# Patient Record
Sex: MALE | Race: WHITE | NOT HISPANIC OR LATINO
[De-identification: names, ages, dates, MRNs, and addresses within clinical notes are randomized per-mention and may not be internally consistent; named-entity substitution may affect disease eponyms.]

---

## 2018-11-23 PROBLEM — Z00.00 ENCOUNTER FOR PREVENTIVE HEALTH EXAMINATION: Status: ACTIVE | Noted: 2018-11-23

## 2018-11-26 ENCOUNTER — APPOINTMENT (OUTPATIENT)
Dept: GASTROENTEROLOGY | Facility: HOSPITAL | Age: 68
End: 2018-11-26

## 2019-01-08 ENCOUNTER — RECORD ABSTRACTING (OUTPATIENT)
Age: 69
End: 2019-01-08

## 2019-01-08 DIAGNOSIS — I49.3 VENTRICULAR PREMATURE DEPOLARIZATION: ICD-10-CM

## 2019-01-08 DIAGNOSIS — Z82.3 FAMILY HISTORY OF STROKE: ICD-10-CM

## 2019-01-08 DIAGNOSIS — Z80.1 FAMILY HISTORY OF MALIGNANT NEOPLASM OF TRACHEA, BRONCHUS AND LUNG: ICD-10-CM

## 2019-01-08 DIAGNOSIS — Z86.39 PERSONAL HISTORY OF OTHER ENDOCRINE, NUTRITIONAL AND METABOLIC DISEASE: ICD-10-CM

## 2019-01-08 DIAGNOSIS — Z86.69 PERSONAL HISTORY OF OTHER DISEASES OF THE NERVOUS SYSTEM AND SENSE ORGANS: ICD-10-CM

## 2019-01-08 DIAGNOSIS — Z82.5 FAMILY HISTORY OF ASTHMA AND OTHER CHRONIC LOWER RESPIRATORY DISEASES: ICD-10-CM

## 2019-01-08 RX ORDER — BIOTIN 10 MG
TABLET ORAL
Refills: 0 | Status: ACTIVE | COMMUNITY

## 2019-01-08 RX ORDER — CALCIUM CARBONATE/VITAMIN D3 600 MG-10
1200 TABLET ORAL
Refills: 0 | Status: ACTIVE | COMMUNITY

## 2019-01-09 ENCOUNTER — APPOINTMENT (OUTPATIENT)
Dept: INTERNAL MEDICINE | Facility: CLINIC | Age: 69
End: 2019-01-09
Payer: MEDICARE

## 2019-01-09 ENCOUNTER — TRANSCRIPTION ENCOUNTER (OUTPATIENT)
Age: 69
End: 2019-01-09

## 2019-01-09 VITALS
HEIGHT: 66 IN | SYSTOLIC BLOOD PRESSURE: 120 MMHG | WEIGHT: 168 LBS | HEART RATE: 68 BPM | BODY MASS INDEX: 27 KG/M2 | DIASTOLIC BLOOD PRESSURE: 80 MMHG

## 2019-01-09 DIAGNOSIS — Z87.891 PERSONAL HISTORY OF NICOTINE DEPENDENCE: ICD-10-CM

## 2019-01-09 PROCEDURE — 99213 OFFICE O/P EST LOW 20 MIN: CPT | Mod: 25

## 2019-01-09 PROCEDURE — 36415 COLL VENOUS BLD VENIPUNCTURE: CPT

## 2019-01-09 NOTE — HISTORY OF PRESENT ILLNESS
[FreeTextEntry1] : here for followup of hypertension [de-identified] :  Compared to last visit the hypertension is at goal . Lifestyle modifications that the patient has adopted include dietary sodium reduction , increasing physical activity , attempts at weight reduction . Responses to the medications has been good . Adverse effects of the medication include none . Patient denies chest pain , palpitations, headaches, but + occasional orthostatic dizziness. \par      Signs of abuse or neglect? No. Fall Risk/History of Falling No. \par      no 3 p's, noctiuria. Last A1c 5.2;

## 2019-01-09 NOTE — REVIEW OF SYSTEMS
[Fever] : no fever [Chills] : no chills [Chest Pain] : no chest pain [Palpitations] : no palpitations [Orthopena] : no orthopnea [Headache] : no headache [Dizziness] : no dizziness

## 2019-01-09 NOTE — PHYSICAL EXAM
[No Acute Distress] : no acute distress [Well Nourished] : well nourished [Normal Sclera/Conjunctiva] : normal sclera/conjunctiva [No JVD] : no jugular venous distention [No Respiratory Distress] : no respiratory distress  [Clear to Auscultation] : lungs were clear to auscultation bilaterally [Normal Rate] : normal rate  [Regular Rhythm] : with a regular rhythm [Normal S1, S2] : normal S1 and S2 [No Murmur] : no murmur heard [No Edema] : there was no peripheral edema [de-identified] : no xanthelasm [de-identified] : +sinus arrhythmia

## 2019-01-09 NOTE — ASSESSMENT
[FreeTextEntry1] : Essential (primary) hypertension - I10 (Primary), Cont med and low salt diet. Urged to increase physical activity and cont efforts at wt loss. Bp control is much better with less dizziness after stopping amlodipine.\par \par Pure hyperglyceridemia - E78.1, Tg's high occasionally but no sx's sec to it and LDL remains ok. Follow. LDL goal is <130 at a minimum. Last LDL at goal 100, \par \par we will recheck A1c if glucose persistently elevated\par

## 2019-01-10 LAB
ALBUMIN SERPL ELPH-MCNC: 4.4 G/DL
ALP BLD-CCNC: 57 U/L
ALT SERPL-CCNC: 22 U/L
ANION GAP SERPL CALC-SCNC: 11 MMOL/L
AST SERPL-CCNC: 15 U/L
BILIRUB SERPL-MCNC: 0.4 MG/DL
BUN SERPL-MCNC: 19 MG/DL
CALCIUM SERPL-MCNC: 9.7 MG/DL
CHLORIDE SERPL-SCNC: 109 MMOL/L
CHOLEST SERPL-MCNC: 220 MG/DL
CHOLEST/HDLC SERPL: 4.9 RATIO
CK SERPL-CCNC: 87 U/L
CO2 SERPL-SCNC: 26 MMOL/L
CREAT SERPL-MCNC: 0.92 MG/DL
GLUCOSE SERPL-MCNC: 105 MG/DL
HDLC SERPL-MCNC: 45 MG/DL
LDLC SERPL CALC-MCNC: 133 MG/DL
POTASSIUM SERPL-SCNC: 5.2 MMOL/L
PROT SERPL-MCNC: 6.8 G/DL
SODIUM SERPL-SCNC: 146 MMOL/L
TRIGL SERPL-MCNC: 209 MG/DL

## 2019-01-15 ENCOUNTER — RESULT REVIEW (OUTPATIENT)
Age: 69
End: 2019-01-15

## 2019-03-04 ENCOUNTER — TRANSCRIPTION ENCOUNTER (OUTPATIENT)
Age: 69
End: 2019-03-04

## 2019-05-04 ENCOUNTER — APPOINTMENT (OUTPATIENT)
Dept: INTERNAL MEDICINE | Facility: CLINIC | Age: 69
End: 2019-05-04
Payer: MEDICARE

## 2019-05-04 VITALS
SYSTOLIC BLOOD PRESSURE: 130 MMHG | WEIGHT: 164 LBS | HEIGHT: 66 IN | DIASTOLIC BLOOD PRESSURE: 82 MMHG | HEART RATE: 68 BPM | BODY MASS INDEX: 26.36 KG/M2

## 2019-05-04 PROCEDURE — 99213 OFFICE O/P EST LOW 20 MIN: CPT | Mod: 25

## 2019-05-04 PROCEDURE — 36415 COLL VENOUS BLD VENIPUNCTURE: CPT

## 2019-05-04 NOTE — REVIEW OF SYSTEMS
[Chest Pain] : no chest pain [Palpitations] : no palpitations [Orthopena] : no orthopnea [Headache] : no headache [Dizziness] : no dizziness

## 2019-05-04 NOTE — ASSESSMENT
[FreeTextEntry1] : Essential (primary) hypertension - I10 (Primary), Cont med and low salt diet. Urged to increase physical activity and cont efforts at wt loss. Bp control is much better \par \par Pure hyperglyceridemia - E78.1, Tg's high occasionally but no sx's sec to it and LDL remains ok. Follow. LDL goal is <130 at a minimum. Last LDL NOT at goal 133 \par \par we will recheck A1c if glucose persistently elevated\par

## 2019-05-04 NOTE — HISTORY OF PRESENT ILLNESS
[FreeTextEntry1] : here for followup of hypertension [de-identified] :  Compared to last visit the hypertension is at goal . Lifestyle modifications that the patient has adopted include dietary sodium reduction , increasing physical activity , attempts at weight reduction . Responses to the medications has been good . Adverse effects of the medication include none . Patient denies chest pain , palpitations, headaches, but + occasional orthostatic dizziness. \par      Signs of abuse or neglect? No. Fall Risk/History of Falling No. \par      no 3 p's, noctiuria. Last A1c 5.2;

## 2019-05-06 LAB
ALBUMIN SERPL ELPH-MCNC: 4.6 G/DL
ALP BLD-CCNC: 51 U/L
ALT SERPL-CCNC: 23 U/L
ANION GAP SERPL CALC-SCNC: 11 MMOL/L
AST SERPL-CCNC: 17 U/L
BILIRUB SERPL-MCNC: 0.5 MG/DL
BUN SERPL-MCNC: 18 MG/DL
CALCIUM SERPL-MCNC: 9.7 MG/DL
CHLORIDE SERPL-SCNC: 107 MMOL/L
CHOLEST SERPL-MCNC: 232 MG/DL
CHOLEST/HDLC SERPL: 5.5 RATIO
CO2 SERPL-SCNC: 24 MMOL/L
CREAT SERPL-MCNC: 0.98 MG/DL
ESTIMATED AVERAGE GLUCOSE: 105 MG/DL
GLUCOSE SERPL-MCNC: 100 MG/DL
HBA1C MFR BLD HPLC: 5.3 %
HDLC SERPL-MCNC: 42 MG/DL
LDLC SERPL CALC-MCNC: 119 MG/DL
POTASSIUM SERPL-SCNC: 4.7 MMOL/L
PROT SERPL-MCNC: 6.9 G/DL
SODIUM SERPL-SCNC: 142 MMOL/L
TRIGL SERPL-MCNC: 356 MG/DL

## 2019-05-07 ENCOUNTER — RESULT REVIEW (OUTPATIENT)
Age: 69
End: 2019-05-07

## 2019-08-03 ENCOUNTER — APPOINTMENT (OUTPATIENT)
Dept: INTERNAL MEDICINE | Facility: CLINIC | Age: 69
End: 2019-08-03
Payer: MEDICARE

## 2019-08-03 VITALS
SYSTOLIC BLOOD PRESSURE: 130 MMHG | HEIGHT: 66 IN | DIASTOLIC BLOOD PRESSURE: 80 MMHG | WEIGHT: 168 LBS | BODY MASS INDEX: 27 KG/M2 | HEART RATE: 64 BPM

## 2019-08-03 PROCEDURE — 99213 OFFICE O/P EST LOW 20 MIN: CPT | Mod: 25

## 2019-08-03 PROCEDURE — 36415 COLL VENOUS BLD VENIPUNCTURE: CPT

## 2019-08-03 NOTE — ASSESSMENT
[FreeTextEntry1] : Essential (primary) hypertension - I10 (Primary), Cont med and low salt diet. Urged to increase physical activity and cont efforts at wt loss. Bp control is GOOD\par \par Pure hyperglyceridemia - E78.1, Tg's high occasionally but no sx's sec to it and LDL remains ok. Follow. LDL goal is <130 at a minimum. Last LDL at goal 119 \par \par

## 2019-08-03 NOTE — HISTORY OF PRESENT ILLNESS
[FreeTextEntry1] : here for followup of hypertension [de-identified] :  Compared to last visit the hypertension is at goal . Lifestyle modifications that the patient has adopted include dietary sodium reduction , increasing physical activity , attempts at weight reduction . Responses to the medications has been good . Adverse effects of the medication include none . Patient denies chest pain , palpitations, headaches, but + occasional orthostatic dizziness. \par      Signs of abuse or neglect? No. Fall Risk/History of Falling No. \par      no 3 p's, noctiuria. Last A1c 5.3;   May

## 2019-08-03 NOTE — REVIEW OF SYSTEMS
[Chest Pain] : no chest pain [Palpitations] : no palpitations [Orthopena] : no orthopnea [Dizziness] : no dizziness [Headache] : no headache

## 2019-08-03 NOTE — PHYSICAL EXAM
[No Acute Distress] : no acute distress [Well Nourished] : well nourished [No JVD] : no jugular venous distention [No Respiratory Distress] : no respiratory distress  [Clear to Auscultation] : lungs were clear to auscultation bilaterally [Normal] : normal rate, regular rhythm, normal S1 and S2 and no murmur heard [No Carotid Bruits] : no carotid bruits [No Edema] : there was no peripheral edema [de-identified] : no xanthelasmas [de-identified] : +sinus arrhythmia

## 2019-08-05 LAB
ALBUMIN SERPL ELPH-MCNC: 4.5 G/DL
ALP BLD-CCNC: 52 U/L
ALT SERPL-CCNC: 22 U/L
ANION GAP SERPL CALC-SCNC: 10 MMOL/L
AST SERPL-CCNC: 16 U/L
BILIRUB SERPL-MCNC: 0.6 MG/DL
BUN SERPL-MCNC: 20 MG/DL
CALCIUM SERPL-MCNC: 9.5 MG/DL
CHLORIDE SERPL-SCNC: 108 MMOL/L
CHOLEST SERPL-MCNC: 240 MG/DL
CHOLEST/HDLC SERPL: 6.2 RATIO
CO2 SERPL-SCNC: 24 MMOL/L
CREAT SERPL-MCNC: 1.01 MG/DL
GLUCOSE SERPL-MCNC: 96 MG/DL
HDLC SERPL-MCNC: 39 MG/DL
LDLC SERPL CALC-MCNC: 130 MG/DL
POTASSIUM SERPL-SCNC: 4.6 MMOL/L
PROT SERPL-MCNC: 6.8 G/DL
SODIUM SERPL-SCNC: 142 MMOL/L
TRIGL SERPL-MCNC: 353 MG/DL

## 2019-08-06 ENCOUNTER — RESULT REVIEW (OUTPATIENT)
Age: 69
End: 2019-08-06

## 2019-11-04 ENCOUNTER — APPOINTMENT (OUTPATIENT)
Dept: INTERNAL MEDICINE | Facility: CLINIC | Age: 69
End: 2019-11-04
Payer: MEDICARE

## 2019-11-04 VITALS
HEART RATE: 68 BPM | BODY MASS INDEX: 26.95 KG/M2 | DIASTOLIC BLOOD PRESSURE: 70 MMHG | SYSTOLIC BLOOD PRESSURE: 150 MMHG | WEIGHT: 167 LBS

## 2019-11-04 PROCEDURE — 36415 COLL VENOUS BLD VENIPUNCTURE: CPT

## 2019-11-04 PROCEDURE — G0444 DEPRESSION SCREEN ANNUAL: CPT | Mod: 59

## 2019-11-04 PROCEDURE — 99213 OFFICE O/P EST LOW 20 MIN: CPT | Mod: 25

## 2019-11-04 PROCEDURE — G0008: CPT

## 2019-11-04 PROCEDURE — 90653 IIV ADJUVANT VACCINE IM: CPT

## 2019-11-04 NOTE — ASSESSMENT
[FreeTextEntry1] : Essential (primary) hypertension - I10 (Primary), Cont med and low salt diet. Urged to increase physical activity and cont efforts at wt loss. Bp control is GOOD\par \par Pure hyperglyceridemia - E78.1, Tg's high occasionally but no sx's sec to it and LDL remains ok. Follow. LDL goal is <130 at a minimum. Last LDL at goal 130 \par \par right knee pain- no residual abnormality of the right knee is noted. No inflammatory changes. Trial of Tylenol and/or Advil as directed discussed with patient.\par \par Risks/benefits of flu vaccine discussed with patient and patient understands and accepts.\par \par

## 2019-11-04 NOTE — PHYSICAL EXAM
[No Acute Distress] : no acute distress [Well Nourished] : well nourished [No JVD] : no jugular venous distention [No Respiratory Distress] : no respiratory distress  [Clear to Auscultation] : lungs were clear to auscultation bilaterally [Normal] : normal rate, regular rhythm, normal S1 and S2 and no murmur heard [No Carotid Bruits] : no carotid bruits [No Edema] : there was no peripheral edema [de-identified] : no xanthelasmas [de-identified] : +sinus arrhythmia; no S4

## 2019-11-05 ENCOUNTER — RESULT REVIEW (OUTPATIENT)
Age: 69
End: 2019-11-05

## 2019-11-05 LAB
ALBUMIN SERPL ELPH-MCNC: 4.4 G/DL
ALP BLD-CCNC: 52 U/L
ALT SERPL-CCNC: 24 U/L
ANION GAP SERPL CALC-SCNC: 13 MMOL/L
AST SERPL-CCNC: 15 U/L
BILIRUB SERPL-MCNC: 0.6 MG/DL
BUN SERPL-MCNC: 15 MG/DL
CALCIUM SERPL-MCNC: 9.4 MG/DL
CHLORIDE SERPL-SCNC: 107 MMOL/L
CHOLEST SERPL-MCNC: 241 MG/DL
CHOLEST/HDLC SERPL: 6 RATIO
CO2 SERPL-SCNC: 24 MMOL/L
CREAT SERPL-MCNC: 0.96 MG/DL
GLUCOSE SERPL-MCNC: 104 MG/DL
HDLC SERPL-MCNC: 40 MG/DL
LDLC SERPL CALC-MCNC: 126 MG/DL
POTASSIUM SERPL-SCNC: 4.4 MMOL/L
PROT SERPL-MCNC: 7 G/DL
SODIUM SERPL-SCNC: 144 MMOL/L
TRIGL SERPL-MCNC: 374 MG/DL

## 2020-02-10 ENCOUNTER — APPOINTMENT (OUTPATIENT)
Dept: INTERNAL MEDICINE | Facility: CLINIC | Age: 70
End: 2020-02-10
Payer: MEDICARE

## 2020-02-10 VITALS
WEIGHT: 166 LBS | SYSTOLIC BLOOD PRESSURE: 146 MMHG | DIASTOLIC BLOOD PRESSURE: 90 MMHG | HEART RATE: 64 BPM | HEIGHT: 66 IN | BODY MASS INDEX: 26.68 KG/M2

## 2020-02-10 PROCEDURE — 99213 OFFICE O/P EST LOW 20 MIN: CPT | Mod: 25

## 2020-02-10 PROCEDURE — 36415 COLL VENOUS BLD VENIPUNCTURE: CPT

## 2020-02-10 NOTE — ASSESSMENT
[FreeTextEntry1] : Essential (primary) hypertension - I10 (Primary), Cont med and low salt diet. Urged to increase physical activity and cont efforts at wt loss. Bp control is NOT GOOD. WIll increase dose to 40 mg\par \par Pure hyperglyceridemia - E78.1, Tg's high occasionally but no sx's sec to it and LDL remains ok. Follow. LDL goal is <130 at a minimum. Last LDL at goal 126 \par \par essential tremors- he may like to stop his mirtazapine as the tremors are not significantly different and the higher dose is causing excessive sedation\par \par

## 2020-02-10 NOTE — PHYSICAL EXAM
[No Acute Distress] : no acute distress [Well Nourished] : well nourished [No JVD] : no jugular venous distention [No Respiratory Distress] : no respiratory distress  [Clear to Auscultation] : lungs were clear to auscultation bilaterally [Normal] : normal rate, regular rhythm, normal S1 and S2 and no murmur heard [No Carotid Bruits] : no carotid bruits [No Edema] : there was no peripheral edema [de-identified] : no xanthelasmas [de-identified] : +sinus arrhythmia; no S4

## 2020-02-10 NOTE — REVIEW OF SYSTEMS
[Palpitations] : no palpitations [Chest Pain] : no chest pain [Headache] : no headache [Orthopena] : no orthopnea [Dizziness] : no dizziness

## 2020-02-11 LAB
ALBUMIN SERPL ELPH-MCNC: 4.3 G/DL
ALP BLD-CCNC: 53 U/L
ALT SERPL-CCNC: 24 U/L
ANION GAP SERPL CALC-SCNC: 13 MMOL/L
AST SERPL-CCNC: 18 U/L
BILIRUB SERPL-MCNC: 0.4 MG/DL
BUN SERPL-MCNC: 12 MG/DL
CALCIUM SERPL-MCNC: 9.5 MG/DL
CHLORIDE SERPL-SCNC: 107 MMOL/L
CHOLEST SERPL-MCNC: 227 MG/DL
CHOLEST/HDLC SERPL: 5.6 RATIO
CO2 SERPL-SCNC: 23 MMOL/L
CREAT SERPL-MCNC: 0.9 MG/DL
GLUCOSE SERPL-MCNC: 111 MG/DL
HDLC SERPL-MCNC: 41 MG/DL
LDLC SERPL CALC-MCNC: 124 MG/DL
POTASSIUM SERPL-SCNC: 4.6 MMOL/L
PROT SERPL-MCNC: 6.4 G/DL
SODIUM SERPL-SCNC: 144 MMOL/L
TRIGL SERPL-MCNC: 312 MG/DL

## 2020-05-04 ENCOUNTER — APPOINTMENT (OUTPATIENT)
Dept: INTERNAL MEDICINE | Facility: CLINIC | Age: 70
End: 2020-05-04
Payer: MEDICARE

## 2020-05-04 PROCEDURE — 99441: CPT | Mod: CR

## 2020-05-04 RX ORDER — LISINOPRIL 40 MG/1
40 TABLET ORAL
Qty: 90 | Refills: 1 | Status: DISCONTINUED | COMMUNITY
Start: 1900-01-01 | End: 2020-05-04

## 2020-06-04 ENCOUNTER — APPOINTMENT (OUTPATIENT)
Dept: INTERNAL MEDICINE | Facility: CLINIC | Age: 70
End: 2020-06-04
Payer: MEDICARE

## 2020-06-04 VITALS
HEIGHT: 66 IN | WEIGHT: 165 LBS | BODY MASS INDEX: 26.52 KG/M2 | SYSTOLIC BLOOD PRESSURE: 138 MMHG | HEART RATE: 68 BPM | DIASTOLIC BLOOD PRESSURE: 90 MMHG

## 2020-06-04 PROCEDURE — 36415 COLL VENOUS BLD VENIPUNCTURE: CPT

## 2020-06-04 PROCEDURE — 99213 OFFICE O/P EST LOW 20 MIN: CPT | Mod: 25

## 2020-06-04 RX ORDER — MIRTAZAPINE 15 MG/1
15 TABLET, FILM COATED ORAL
Qty: 90 | Refills: 0 | Status: ACTIVE | COMMUNITY

## 2020-06-04 NOTE — ASSESSMENT
[FreeTextEntry1] : Essential (primary) hypertension - I10 (Primary), Cont med and low salt diet. Urged to increase physical activity and cont efforts at wt loss. Bp control is NOT GOOD. WIll increase dose to 40 mg\par \par Pure hyperglyceridemia - E78.1, Tg's high occasionally but no sx's sec to it and LDL remains ok. Follow. LDL goal is <130 at a minimum. Last LDL at goal 124 \par \par essential tremors- he decreased his mirtazapine as the tremors are not significantly different and the higher dose was causing excessive sedation. We discuss low-dose gabapentin risk and benefits. He is averse to trying it as I had already changed his blood pressure medicine and he was reluctant to start a new medicine at the same time. He will consider starting the gabapentin.\par Discussed code antibody testing and he consents to same\par \par

## 2020-06-04 NOTE — PHYSICAL EXAM
[No Acute Distress] : no acute distress [Well Nourished] : well nourished [No JVD] : no jugular venous distention [No Respiratory Distress] : no respiratory distress  [Clear to Auscultation] : lungs were clear to auscultation bilaterally [No Carotid Bruits] : no carotid bruits [Normal] : normal rate, regular rhythm, normal S1 and S2 and no murmur heard [No Edema] : there was no peripheral edema [de-identified] : no xanthelasmas [de-identified] : +sinus arrhythmia; no S4

## 2020-06-04 NOTE — HISTORY OF PRESENT ILLNESS
[FreeTextEntry1] : comes fastiing\par here for followup of hypertension\par med for tremors increased but makes him sedated; neuro offered gabapentin [de-identified] : His home blood pressure monitoring levels were improved after increasing his medication last time.  However this morning his blood pressure was 142 on his monitor and that was confirmed today in the office.\par Compared to last visit the hypertension is IMPROVED. Lifestyle modifications that the patient has adopted include dietary sodium reduction, increasing physical activity, attempts at weight reduction. Responses to the medications has been good . Adverse effects of the medication include none . Patient denies chest pain , palpitations, headaches, but + occasional orthostatic dizziness. \par      Signs of abuse or neglect? No. Fall Risk/History of Falling No. \par      no 3 p's, nocturia. Last A1c 5.3;   Feb

## 2020-06-05 LAB
ALBUMIN SERPL ELPH-MCNC: 4.6 G/DL
ALP BLD-CCNC: 60 U/L
ALT SERPL-CCNC: 32 U/L
ANION GAP SERPL CALC-SCNC: 12 MMOL/L
AST SERPL-CCNC: 23 U/L
BILIRUB SERPL-MCNC: 0.5 MG/DL
BUN SERPL-MCNC: 17 MG/DL
CALCIUM SERPL-MCNC: 10 MG/DL
CHLORIDE SERPL-SCNC: 102 MMOL/L
CHOLEST SERPL-MCNC: 259 MG/DL
CHOLEST/HDLC SERPL: 5.9 RATIO
CO2 SERPL-SCNC: 26 MMOL/L
CREAT SERPL-MCNC: 1.02 MG/DL
GLUCOSE SERPL-MCNC: 99 MG/DL
HDLC SERPL-MCNC: 44 MG/DL
LDLC SERPL CALC-MCNC: NORMAL MG/DL
POTASSIUM SERPL-SCNC: 4.1 MMOL/L
PROT SERPL-MCNC: 7.2 G/DL
SARS-COV-2 IGG SERPL IA-ACNC: <3.8 AU/ML
SARS-COV-2 IGG SERPL QL IA: NEGATIVE
SODIUM SERPL-SCNC: 140 MMOL/L
TRIGL SERPL-MCNC: 445 MG/DL

## 2020-09-03 ENCOUNTER — APPOINTMENT (OUTPATIENT)
Dept: INTERNAL MEDICINE | Facility: CLINIC | Age: 70
End: 2020-09-03
Payer: MEDICARE

## 2020-09-03 VITALS
WEIGHT: 166 LBS | BODY MASS INDEX: 26.79 KG/M2 | SYSTOLIC BLOOD PRESSURE: 140 MMHG | HEART RATE: 66 BPM | DIASTOLIC BLOOD PRESSURE: 80 MMHG

## 2020-09-03 DIAGNOSIS — Z72.89 OTHER PROBLEMS RELATED TO LIFESTYLE: ICD-10-CM

## 2020-09-03 PROCEDURE — 99213 OFFICE O/P EST LOW 20 MIN: CPT | Mod: 25

## 2020-09-03 PROCEDURE — G0442 ANNUAL ALCOHOL SCREEN 15 MIN: CPT | Mod: 59

## 2020-09-03 PROCEDURE — 36415 COLL VENOUS BLD VENIPUNCTURE: CPT

## 2020-09-03 RX ORDER — LISINOPRIL AND HYDROCHLOROTHIAZIDE TABLETS 20; 12.5 MG/1; MG/1
20-12.5 TABLET ORAL
Qty: 90 | Refills: 1 | Status: DISCONTINUED | COMMUNITY
Start: 2020-05-04 | End: 2020-09-03

## 2020-09-03 NOTE — ASSESSMENT
[FreeTextEntry1] : Essential (primary) hypertension - I10 (Primary), Cont med and low salt diet. Urged to increase physical activity and cont efforts at wt loss. Bp control is NOT GOOD. WIll increase dose to 30 mg\par \par Pure hyperglyceridemia - E78.1, Tg's high occasionally but no sx's sec to it and LDL remains ok. Follow. LDL goal is <130 at a minimum. Last LDL at goal - \par \par essential tremors- He will consider starting the gabapentin but prefers no medication for now\par \par defers flu vaccine till later\par

## 2020-09-03 NOTE — PLAN
[FreeTextEntry1] : check labs\par Renew lisinopril 30 mg daily\par return 3 months with his bp machine

## 2020-09-03 NOTE — REVIEW OF SYSTEMS
[Negative] : Respiratory [Chills] : no chills [Fever] : no fever [Chest Pain] : no chest pain [Palpitations] : no palpitations [Orthopena] : no orthopnea [Headache] : no headache [Dizziness] : no dizziness

## 2020-09-03 NOTE — HEALTH RISK ASSESSMENT
[Yes] : Yes [3 or 4 (1 pt)] : 3 or 4  (1 point) [4 or more  times a week (4 pts)] : 4 or more  times a week (4 points) [Never (0 pts)] : Never (0 points) [No] : In the past 12 months have you used drugs other than those required for medical reasons? No [No falls in past year] : Patient reported no falls in the past year [Audit-CScore] : 5

## 2020-09-03 NOTE — PHYSICAL EXAM
[No Acute Distress] : no acute distress [Well Nourished] : well nourished [No JVD] : no jugular venous distention [No Respiratory Distress] : no respiratory distress  [Normal] : normal rate, regular rhythm, normal S1 and S2 and no murmur heard [Clear to Auscultation] : lungs were clear to auscultation bilaterally [No Carotid Bruits] : no carotid bruits [No Edema] : there was no peripheral edema [de-identified] : no xanthelasmas [de-identified] : +sinus arrhythmia; no S4

## 2020-09-03 NOTE — HISTORY OF PRESENT ILLNESS
[FreeTextEntry1] : here for followup of hypertension\par I ordered 40 mg daily but is taking 30 mg\par med for tremors increased but makes him sedated; neuro offered gabapentin [de-identified] : His home blood pressure monitoring levels were improved after increasing his medication last time.  However this morning his blood pressure was 142 on his monitor and that was confirmed today in the office.\par Compared to last visit the hypertension is IMPROVED. Lifestyle modifications that the patient has adopted include dietary sodium reduction, increasing physical activity, attempts at weight reduction. Responses to the medications has been good . Adverse effects of the medication include none . Patient denies chest pain , palpitations, headaches, but + occasional orthostatic dizziness. \par      Signs of abuse or neglect? No. Fall Risk/History of Falling No.  His weight has CHANGED BY +1\par  Last LDL -  June

## 2020-09-05 LAB
ALBUMIN SERPL ELPH-MCNC: 4.9 G/DL
ALP BLD-CCNC: 51 U/L
ALT SERPL-CCNC: 32 U/L
ANION GAP SERPL CALC-SCNC: 16 MMOL/L
AST SERPL-CCNC: 19 U/L
BILIRUB SERPL-MCNC: 0.5 MG/DL
BUN SERPL-MCNC: 16 MG/DL
CALCIUM SERPL-MCNC: 9.9 MG/DL
CHLORIDE SERPL-SCNC: 104 MMOL/L
CHOLEST SERPL-MCNC: 246 MG/DL
CHOLEST/HDLC SERPL: 5.8 RATIO
CO2 SERPL-SCNC: 24 MMOL/L
CREAT SERPL-MCNC: 0.97 MG/DL
GLUCOSE SERPL-MCNC: 96 MG/DL
HDLC SERPL-MCNC: 42 MG/DL
LDLC SERPL CALC-MCNC: NORMAL MG/DL
POTASSIUM SERPL-SCNC: 4.3 MMOL/L
PROT SERPL-MCNC: 7.1 G/DL
SODIUM SERPL-SCNC: 144 MMOL/L
TRIGL SERPL-MCNC: 438 MG/DL

## 2020-12-02 ENCOUNTER — RX RENEWAL (OUTPATIENT)
Age: 70
End: 2020-12-02

## 2020-12-03 ENCOUNTER — APPOINTMENT (OUTPATIENT)
Dept: INTERNAL MEDICINE | Facility: CLINIC | Age: 70
End: 2020-12-03
Payer: MEDICARE

## 2020-12-03 VITALS
HEART RATE: 68 BPM | HEIGHT: 66 IN | SYSTOLIC BLOOD PRESSURE: 110 MMHG | DIASTOLIC BLOOD PRESSURE: 80 MMHG | WEIGHT: 160 LBS | BODY MASS INDEX: 25.71 KG/M2

## 2020-12-03 DIAGNOSIS — N52.2 DRUG-INDUCED ERECTILE DYSFUNCTION: ICD-10-CM

## 2020-12-03 PROCEDURE — 99214 OFFICE O/P EST MOD 30 MIN: CPT | Mod: 25

## 2020-12-03 PROCEDURE — 36415 COLL VENOUS BLD VENIPUNCTURE: CPT

## 2020-12-03 NOTE — ASSESSMENT
[FreeTextEntry1] : Essential (primary) hypertension - I10 (Primary), Cont med and low salt diet. Urged to increase physical activity and cont efforts at wt loss. Bp control is NOT GOOD. WIll increase dose to 30 mg\par \par Pure hyperglyceridemia - E78.1, Tg's high occasionally but no sx's sec to it and LDL remains ok. Follow. LDL goal is <130 at a minimum. Last LDL at goal - . D/w pt at length: reviewing his last two year's results, will start statin. R/b d/w pt.\par \par essential tremors- He will consider starting the gabapentin but prefers no medication for now\par \par Impaired fasting glucose- last glucose was okay but prior were elevated.  Check A1c\par \par Erectile dysfunction- likely a candidate for medication but will refer to [facility] per his request for evaluation\par

## 2020-12-03 NOTE — REVIEW OF SYSTEMS
[Negative] : Respiratory [Fever] : no fever [Chills] : no chills [Chest Pain] : no chest pain [Palpitations] : no palpitations [Orthopena] : no orthopnea [Headache] : no headache [Dizziness] : no dizziness

## 2020-12-03 NOTE — PHYSICAL EXAM
[No Acute Distress] : no acute distress [Well Nourished] : well nourished [No JVD] : no jugular venous distention [No Respiratory Distress] : no respiratory distress  [Clear to Auscultation] : lungs were clear to auscultation bilaterally [Normal] : normal rate, regular rhythm, normal S1 and S2 and no murmur heard [No Carotid Bruits] : no carotid bruits [No Edema] : there was no peripheral edema [de-identified] : no xanthelasmas [de-identified] : +sinus arrhythmia; no S4

## 2020-12-05 LAB
ALBUMIN SERPL ELPH-MCNC: 4.6 G/DL
ALP BLD-CCNC: 55 U/L
ALT SERPL-CCNC: 27 U/L
ANION GAP SERPL CALC-SCNC: 16 MMOL/L
AST SERPL-CCNC: 18 U/L
BASOPHILS # BLD AUTO: 0.08 K/UL
BASOPHILS NFR BLD AUTO: 1.1 %
BILIRUB SERPL-MCNC: 0.5 MG/DL
BUN SERPL-MCNC: 18 MG/DL
CALCIUM SERPL-MCNC: 10.1 MG/DL
CHLORIDE SERPL-SCNC: 97 MMOL/L
CHOLEST SERPL-MCNC: 251 MG/DL
CO2 SERPL-SCNC: 26 MMOL/L
CREAT SERPL-MCNC: 1.03 MG/DL
EOSINOPHIL # BLD AUTO: 0.18 K/UL
EOSINOPHIL NFR BLD AUTO: 2.4 %
ESTIMATED AVERAGE GLUCOSE: 114 MG/DL
GLUCOSE SERPL-MCNC: 61 MG/DL
HBA1C MFR BLD HPLC: 5.6 %
HCT VFR BLD CALC: 46.1 %
HDLC SERPL-MCNC: 42 MG/DL
HGB BLD-MCNC: 15.5 G/DL
IMM GRANULOCYTES NFR BLD AUTO: 0.4 %
LDLC SERPL CALC-MCNC: NORMAL MG/DL
LYMPHOCYTES # BLD AUTO: 3.39 K/UL
LYMPHOCYTES NFR BLD AUTO: 44.6 %
MAN DIFF?: NORMAL
MCHC RBC-ENTMCNC: 31.8 PG
MCHC RBC-ENTMCNC: 33.6 GM/DL
MCV RBC AUTO: 94.7 FL
MONOCYTES # BLD AUTO: 0.84 K/UL
MONOCYTES NFR BLD AUTO: 11.1 %
NEUTROPHILS # BLD AUTO: 3.08 K/UL
NEUTROPHILS NFR BLD AUTO: 40.4 %
NONHDLC SERPL-MCNC: 210 MG/DL
PLATELET # BLD AUTO: 261 K/UL
POTASSIUM SERPL-SCNC: 4.3 MMOL/L
PROT SERPL-MCNC: 7.2 G/DL
RBC # BLD: 4.87 M/UL
RBC # FLD: 12.2 %
SODIUM SERPL-SCNC: 139 MMOL/L
TRIGL SERPL-MCNC: 418 MG/DL
WBC # FLD AUTO: 7.6 K/UL

## 2020-12-09 ENCOUNTER — NON-APPOINTMENT (OUTPATIENT)
Age: 70
End: 2020-12-09

## 2021-02-05 ENCOUNTER — NON-APPOINTMENT (OUTPATIENT)
Age: 71
End: 2021-02-05

## 2021-02-08 ENCOUNTER — RX RENEWAL (OUTPATIENT)
Age: 71
End: 2021-02-08

## 2021-03-10 ENCOUNTER — APPOINTMENT (OUTPATIENT)
Dept: INTERNAL MEDICINE | Facility: CLINIC | Age: 71
End: 2021-03-10
Payer: MEDICARE

## 2021-03-10 ENCOUNTER — NON-APPOINTMENT (OUTPATIENT)
Age: 71
End: 2021-03-10

## 2021-03-10 VITALS
WEIGHT: 160 LBS | HEART RATE: 56 BPM | DIASTOLIC BLOOD PRESSURE: 80 MMHG | BODY MASS INDEX: 25.71 KG/M2 | SYSTOLIC BLOOD PRESSURE: 118 MMHG | HEIGHT: 66 IN | RESPIRATION RATE: 18 BRPM

## 2021-03-10 VITALS
BODY MASS INDEX: 25.71 KG/M2 | WEIGHT: 160 LBS | SYSTOLIC BLOOD PRESSURE: 118 MMHG | HEIGHT: 66 IN | HEART RATE: 72 BPM | DIASTOLIC BLOOD PRESSURE: 80 MMHG

## 2021-03-10 PROCEDURE — 36415 COLL VENOUS BLD VENIPUNCTURE: CPT

## 2021-03-10 PROCEDURE — 99214 OFFICE O/P EST MOD 30 MIN: CPT | Mod: 25

## 2021-03-10 NOTE — PHYSICAL EXAM
[Well Nourished] : well nourished [No JVD] : no jugular venous distention [No Respiratory Distress] : no respiratory distress  [Clear to Auscultation] : lungs were clear to auscultation bilaterally [Normal] : normal rate, regular rhythm, normal S1 and S2 and no murmur heard [No Carotid Bruits] : no carotid bruits [No Edema] : there was no peripheral edema [de-identified] : no xanthelasmas [de-identified] : +sinus arrhythmia; no S4

## 2021-03-10 NOTE — ASSESSMENT
[FreeTextEntry1] : Essential (primary) hypertension - I10 (Primary), Cont lisinopril-hydrochlorothiazide and low salt diet. Urged to increase physical activity and cont efforts at wt loss. Bp control is GOOD. \par \par Pure hyperglyceridemia - E78.1, Tg's high occasionally but no sx's sec to it and LDL remains ok. Follow. LDL goal is <130 at a minimum. Last LDL at goal - . D/w pt at length: reviewing his last 2+ year's results, wwe will increase his atorvastatin to 40 mg daily iif triglycerides still high. We discussed the possibility of adding a fenofibrate but he prefers not to add another medication to his regimen rather he prefers to increase his atorvastatin.\par \par essential tremors- continue propranolol and neuro follow up\par \par Erectile dysfunction- likely a candidate for medication but will refer to  per his request for evaluation\par

## 2021-03-10 NOTE — HISTORY OF PRESENT ILLNESS
[FreeTextEntry1] : here for followup of hypertension, hypertriglyceridemia\par \par  [de-identified] : Hypercholesterolemia: Last LDL -  Dec\par Major cardiovascular risk factors include AGE, MALE, HTN, CHOL. The LDL goal for this patient is < 130 mg/dl.  Dietary modifications have included a low fat diet.  Exercise modifications have included WALKING several miles a week. Responses to the medications have been FAIR. The patient's adherence to the medication treatment is GOOD. The side effects of the medication include NONE; the patient specifically denies myalgias.\par Compared to last visit the hypertension is IMPROVED. Lifestyle modifications that the patient has adopted include dietary sodium reduction, increasing physical activity, attempts at weight reduction. Responses to the medications has been good . Adverse effects of the medication include none . Patient denies chest pain , palpitations, headaches, but + occasional orthostatic dizziness. \par      Signs of abuse or neglect? No. Fall Risk/History of Falling No.  His weight has CHANGED BY +2\par

## 2021-03-11 ENCOUNTER — NON-APPOINTMENT (OUTPATIENT)
Age: 71
End: 2021-03-11

## 2021-03-11 LAB
ALBUMIN SERPL ELPH-MCNC: 4.5 G/DL
ALP BLD-CCNC: 54 U/L
ALT SERPL-CCNC: 26 U/L
ANION GAP SERPL CALC-SCNC: 12 MMOL/L
AST SERPL-CCNC: 21 U/L
BILIRUB DIRECT SERPL-MCNC: 0.2 MG/DL
BILIRUB INDIRECT SERPL-MCNC: 0.5 MG/DL
BILIRUB SERPL-MCNC: 0.6 MG/DL
BUN SERPL-MCNC: 17 MG/DL
CALCIUM SERPL-MCNC: 9.8 MG/DL
CHLORIDE SERPL-SCNC: 101 MMOL/L
CHOLEST SERPL-MCNC: 154 MG/DL
CO2 SERPL-SCNC: 28 MMOL/L
CREAT SERPL-MCNC: 0.97 MG/DL
GLUCOSE SERPL-MCNC: 95 MG/DL
HDLC SERPL-MCNC: 45 MG/DL
LDLC SERPL CALC-MCNC: 57 MG/DL
NONHDLC SERPL-MCNC: 108 MG/DL
POTASSIUM SERPL-SCNC: 4.3 MMOL/L
PROT SERPL-MCNC: 7 G/DL
SODIUM SERPL-SCNC: 141 MMOL/L
TRIGL SERPL-MCNC: 256 MG/DL

## 2021-06-09 NOTE — PHYSICAL EXAM
Put in 5 year colon recall per    [No Acute Distress] : no acute distress [Well Nourished] : well nourished [Normal Sclera/Conjunctiva] : normal sclera/conjunctiva [No JVD] : no jugular venous distention [Clear to Auscultation] : lungs were clear to auscultation bilaterally [No Respiratory Distress] : no respiratory distress  [Regular Rhythm] : with a regular rhythm [Normal S1, S2] : normal S1 and S2 [Normal Rate] : normal rate  [No Edema] : there was no peripheral edema [No Murmur] : no murmur heard [de-identified] : no xanthelasma [de-identified] : +sinus arrhythmia

## 2021-06-21 ENCOUNTER — APPOINTMENT (OUTPATIENT)
Dept: INTERNAL MEDICINE | Facility: CLINIC | Age: 71
End: 2021-06-21
Payer: MEDICARE

## 2021-06-21 VITALS
WEIGHT: 164 LBS | DIASTOLIC BLOOD PRESSURE: 80 MMHG | HEIGHT: 66 IN | HEART RATE: 54 BPM | SYSTOLIC BLOOD PRESSURE: 120 MMHG | BODY MASS INDEX: 26.36 KG/M2

## 2021-06-21 PROCEDURE — 99214 OFFICE O/P EST MOD 30 MIN: CPT | Mod: 25

## 2021-06-21 PROCEDURE — 36415 COLL VENOUS BLD VENIPUNCTURE: CPT

## 2021-06-21 NOTE — ASSESSMENT
[FreeTextEntry1] : Essential (primary) hypertension - I10 (Primary), Cont lisinopril-hydrochlorothiazide and low salt diet. Urged to increase physical activity and cont efforts at wt loss. Bp control is GOOD. \par \par Pure hyperglyceridemia - E78.1, Tg's high occasionally but no sx's sec to it and LDL remains ok. Follow. Continue atorvastatin and low cholesterol diet. Urged stronger efforts at weight loss. LDL goal is <130 at a minimum. Last LDL at goal 57 \par \par essential tremors- continue propranolol and neuro follow up\par \par Erectile dysfunction- likely a candidate for medication but will refer to  per his request for evaluation\par

## 2021-06-21 NOTE — PHYSICAL EXAM
[Well Nourished] : well nourished [No JVD] : no jugular venous distention [No Respiratory Distress] : no respiratory distress  [Clear to Auscultation] : lungs were clear to auscultation bilaterally [Normal] : normal rate, regular rhythm, normal S1 and S2 and no murmur heard [No Carotid Bruits] : no carotid bruits [No Edema] : there was no peripheral edema [de-identified] : no xanthelasmas [de-identified] : +sinus arrhythmia; no S4

## 2021-06-21 NOTE — HISTORY OF PRESENT ILLNESS
[FreeTextEntry1] : here for followup of hypertension, hypertriglyceridemia\par \par  [de-identified] : Hypercholesterolemia: Last LDL 57  Mar\par Major cardiovascular risk factors include AGE, MALE, HTN, CHOL. The LDL goal for this patient is <130 mg/dl.  Dietary modifications have included a low fat diet.  Exercise modifications have included WALKING several miles a week. Responses to the medications have been FAIR. The patient's adherence to the medication treatment is GOOD. The side effects of the medication include NONE; the patient specifically denies myalgias. His weight has CHANGED BY +4 MORE\par Compared to last visit the hypertension is AT GOAL.Lifestyle modifications that the patient has adopted include dietary sodium reduction, increasing physical activity, attempts at weight reduction. Responses to the medications has been good . Adverse effects of the medication include none . Patient denies chest pain , palpitations, headaches, but + occasional orthostatic dizziness. \par      Signs of abuse or neglect? No. Fall Risk/History of Falling No.  \par

## 2021-06-22 LAB
ALBUMIN SERPL ELPH-MCNC: 4.6 G/DL
ALP BLD-CCNC: 56 U/L
ALT SERPL-CCNC: 28 U/L
ANION GAP SERPL CALC-SCNC: 12 MMOL/L
AST SERPL-CCNC: 21 U/L
BASOPHILS # BLD AUTO: 0.06 K/UL
BASOPHILS NFR BLD AUTO: 0.9 %
BILIRUB DIRECT SERPL-MCNC: 0.2 MG/DL
BILIRUB INDIRECT SERPL-MCNC: 0.4 MG/DL
BILIRUB SERPL-MCNC: 0.6 MG/DL
BUN SERPL-MCNC: 21 MG/DL
CALCIUM SERPL-MCNC: 10 MG/DL
CHLORIDE SERPL-SCNC: 102 MMOL/L
CHOLEST SERPL-MCNC: 165 MG/DL
CO2 SERPL-SCNC: 25 MMOL/L
CREAT SERPL-MCNC: 0.96 MG/DL
EOSINOPHIL # BLD AUTO: 0.13 K/UL
EOSINOPHIL NFR BLD AUTO: 1.9 %
GLUCOSE SERPL-MCNC: 104 MG/DL
HCT VFR BLD CALC: 43.8 %
HDLC SERPL-MCNC: 45 MG/DL
HGB BLD-MCNC: 14.5 G/DL
IMM GRANULOCYTES NFR BLD AUTO: 0.3 %
LDLC SERPL CALC-MCNC: 67 MG/DL
LYMPHOCYTES # BLD AUTO: 2.75 K/UL
LYMPHOCYTES NFR BLD AUTO: 40.6 %
MAN DIFF?: NORMAL
MCHC RBC-ENTMCNC: 31.3 PG
MCHC RBC-ENTMCNC: 33.1 GM/DL
MCV RBC AUTO: 94.4 FL
MONOCYTES # BLD AUTO: 0.74 K/UL
MONOCYTES NFR BLD AUTO: 10.9 %
NEUTROPHILS # BLD AUTO: 3.07 K/UL
NEUTROPHILS NFR BLD AUTO: 45.4 %
NONHDLC SERPL-MCNC: 120 MG/DL
PLATELET # BLD AUTO: 249 K/UL
POTASSIUM SERPL-SCNC: 4.1 MMOL/L
PROT SERPL-MCNC: 7.1 G/DL
RBC # BLD: 4.64 M/UL
RBC # FLD: 12.8 %
SODIUM SERPL-SCNC: 140 MMOL/L
TRIGL SERPL-MCNC: 263 MG/DL
WBC # FLD AUTO: 6.77 K/UL

## 2021-09-27 ENCOUNTER — APPOINTMENT (OUTPATIENT)
Dept: INTERNAL MEDICINE | Facility: CLINIC | Age: 71
End: 2021-09-27
Payer: MEDICARE

## 2021-09-27 VITALS
DIASTOLIC BLOOD PRESSURE: 80 MMHG | BODY MASS INDEX: 26.68 KG/M2 | WEIGHT: 166 LBS | HEIGHT: 66 IN | SYSTOLIC BLOOD PRESSURE: 130 MMHG | HEART RATE: 55 BPM

## 2021-09-27 PROCEDURE — 99214 OFFICE O/P EST MOD 30 MIN: CPT | Mod: 25

## 2021-09-27 PROCEDURE — 36415 COLL VENOUS BLD VENIPUNCTURE: CPT

## 2021-09-27 PROCEDURE — 90662 IIV NO PRSV INCREASED AG IM: CPT

## 2021-09-27 PROCEDURE — G0008: CPT

## 2021-09-27 NOTE — PHYSICAL EXAM
[Well Nourished] : well nourished [No JVD] : no jugular venous distention [No Respiratory Distress] : no respiratory distress  [Clear to Auscultation] : lungs were clear to auscultation bilaterally [Normal] : normal rate, regular rhythm, normal S1 and S2 and no murmur heard [No Carotid Bruits] : no carotid bruits [No Edema] : there was no peripheral edema [de-identified] : no xanthelasmas [de-identified] : +sinus arrhythmia; no S4

## 2021-09-27 NOTE — HISTORY OF PRESENT ILLNESS
[FreeTextEntry1] : here for followup of hypertension, hypertriglyceridemia\par has lots of question re: covid vaccines\par pain left hip x months\par \par  [de-identified] : pain in left hip only occurs on some nights and is immediately relieved by changing position. nonradiating, level 4-5/10, without associated symptoms. He is able to perform all activities during the day.\par Hypercholesterolemia: Last LDL 67  Selam\par Major cardiovascular risk factors include AGE, MALE, HTN, CHOL. The LDL goal for this patient is <130 mg/dl.  Dietary modifications have included a low fat diet.  Exercise modifications have included WALKING several miles a week. Responses to the medications have been GOOD. The patient's adherence to the medication treatment is GOOD. The side effects of the medication include NONE; the patient specifically denies myalgias. His weight has CHANGED BY +2 MORE\par \par HTN- Compared to last visit the hypertension is AT GOAL. Lifestyle modifications that the patient has adopted include dietary sodium reduction, increasing physical activity, attempts at weight reduction. Responses to the medications has been good . Adverse effects of the medication include none . Patient denies chest pain , palpitations, headaches, but + occasional orthostatic dizziness. \par      Signs of abuse or neglect? No. Fall Risk/History of Falling No.  \par

## 2021-09-27 NOTE — PLAN
[FreeTextEntry1] : Check labs and continue meds\par Return 3 months for cpe\par High-dose flu vaccine

## 2021-09-28 LAB
ALBUMIN SERPL ELPH-MCNC: 4.7 G/DL
ALP BLD-CCNC: 56 U/L
ALT SERPL-CCNC: 26 U/L
ANION GAP SERPL CALC-SCNC: 12 MMOL/L
AST SERPL-CCNC: 20 U/L
BILIRUB DIRECT SERPL-MCNC: 0.1 MG/DL
BILIRUB INDIRECT SERPL-MCNC: 0.4 MG/DL
BILIRUB SERPL-MCNC: 0.5 MG/DL
BUN SERPL-MCNC: 20 MG/DL
CALCIUM SERPL-MCNC: 10 MG/DL
CHLORIDE SERPL-SCNC: 101 MMOL/L
CHOLEST SERPL-MCNC: 156 MG/DL
CO2 SERPL-SCNC: 26 MMOL/L
CREAT SERPL-MCNC: 1.03 MG/DL
GLUCOSE SERPL-MCNC: 102 MG/DL
HDLC SERPL-MCNC: 47 MG/DL
LDLC SERPL CALC-MCNC: 66 MG/DL
NONHDLC SERPL-MCNC: 110 MG/DL
POTASSIUM SERPL-SCNC: 4.5 MMOL/L
PROT SERPL-MCNC: 7 G/DL
SODIUM SERPL-SCNC: 140 MMOL/L
TRIGL SERPL-MCNC: 220 MG/DL

## 2021-12-16 ENCOUNTER — APPOINTMENT (OUTPATIENT)
Dept: INTERNAL MEDICINE | Facility: CLINIC | Age: 71
End: 2021-12-16
Payer: MEDICARE

## 2021-12-16 VITALS
SYSTOLIC BLOOD PRESSURE: 122 MMHG | WEIGHT: 167 LBS | HEART RATE: 58 BPM | HEIGHT: 66 IN | BODY MASS INDEX: 26.84 KG/M2 | DIASTOLIC BLOOD PRESSURE: 80 MMHG

## 2021-12-16 PROCEDURE — 99214 OFFICE O/P EST MOD 30 MIN: CPT | Mod: 25

## 2021-12-16 PROCEDURE — 36415 COLL VENOUS BLD VENIPUNCTURE: CPT

## 2021-12-16 NOTE — ASSESSMENT
[FreeTextEntry1] : Essential (primary) hypertension - I10 (Primary), Cont lisinopril-hydrochlorothiazide and low salt diet. Urged to increase physical activity and cont efforts at wt loss. Bp control is GOOD. \par \par Pure hyperglyceridemia - E78.1, Tg's high occasionally but no sx's sec to it and LDL remains ok. Follow. Continue atorvastatin and low cholesterol diet. Urged stronger efforts at weight loss. LDL goal is <130 at a minimum. Last LDL at goal 66  Sept\par \par essential tremors- continue propranolol and neuro follow up\par \par Erectile dysfunction- likely a candidate for medication but will refer to  per his request for evaluation\par \par \par \par \par

## 2021-12-16 NOTE — HISTORY OF PRESENT ILLNESS
[FreeTextEntry1] : here for followup of hypertension, hypertriglyceridemia\par \par  [de-identified] : Hypercholesterolemia: Last LDL 66  Sept\par Major cardiovascular risk factors include AGE, MALE, HTN, CHOL. The LDL goal for this patient is <130 mg/dl.  Dietary modifications have included a low fat diet.  Exercise modifications have included WALKING several miles a week. Responses to the medications have been GOOD. The patient's adherence to the medication treatment is GOOD. The side effects of the medication include NONE; the patient specifically denies myalgias. His weight has CHANGED BY +2 MORE\par \par HTN- Compared to last visit the hypertension is AT GOAL. Lifestyle modifications that the patient has adopted include dietary sodium reduction, increasing physical activity, attempts at weight reduction. Responses to the medications has been good . Adverse effects of the medication include none . Patient denies chest pain , palpitations, headaches, but + occasional orthostatic dizziness. \par      Signs of abuse or neglect? No. Fall Risk/History of Falling No.  \par

## 2021-12-16 NOTE — PHYSICAL EXAM
[Well Nourished] : well nourished [No JVD] : no jugular venous distention [No Respiratory Distress] : no respiratory distress  [Clear to Auscultation] : lungs were clear to auscultation bilaterally [Normal] : normal rate, regular rhythm, normal S1 and S2 and no murmur heard [No Carotid Bruits] : no carotid bruits [No Edema] : there was no peripheral edema [de-identified] : no xanthelasmas [de-identified] : +sinus arrhythmia; no S4

## 2021-12-18 LAB
ALBUMIN SERPL ELPH-MCNC: 4.7 G/DL
ALP BLD-CCNC: 59 U/L
ALT SERPL-CCNC: 38 U/L
ANION GAP SERPL CALC-SCNC: 12 MMOL/L
AST SERPL-CCNC: 25 U/L
BILIRUB DIRECT SERPL-MCNC: 0.1 MG/DL
BILIRUB INDIRECT SERPL-MCNC: 0.3 MG/DL
BILIRUB SERPL-MCNC: 0.4 MG/DL
BUN SERPL-MCNC: 17 MG/DL
CALCIUM SERPL-MCNC: 10.1 MG/DL
CHLORIDE SERPL-SCNC: 102 MMOL/L
CHOLEST SERPL-MCNC: 167 MG/DL
CO2 SERPL-SCNC: 25 MMOL/L
CREAT SERPL-MCNC: 1.02 MG/DL
GLUCOSE SERPL-MCNC: 103 MG/DL
HDLC SERPL-MCNC: 42 MG/DL
LDLC SERPL CALC-MCNC: 49 MG/DL
NONHDLC SERPL-MCNC: 125 MG/DL
POTASSIUM SERPL-SCNC: 4.4 MMOL/L
PROT SERPL-MCNC: 6.8 G/DL
SODIUM SERPL-SCNC: 139 MMOL/L
TRIGL SERPL-MCNC: 379 MG/DL

## 2022-03-03 ENCOUNTER — NON-APPOINTMENT (OUTPATIENT)
Age: 72
End: 2022-03-03

## 2022-03-03 ENCOUNTER — APPOINTMENT (OUTPATIENT)
Dept: INTERNAL MEDICINE | Facility: CLINIC | Age: 72
End: 2022-03-03
Payer: MEDICARE

## 2022-03-03 VITALS
HEART RATE: 60 BPM | SYSTOLIC BLOOD PRESSURE: 130 MMHG | BODY MASS INDEX: 26.68 KG/M2 | HEIGHT: 66 IN | DIASTOLIC BLOOD PRESSURE: 80 MMHG | WEIGHT: 166 LBS

## 2022-03-03 VITALS
RESPIRATION RATE: 18 BRPM | WEIGHT: 166 LBS | SYSTOLIC BLOOD PRESSURE: 130 MMHG | HEART RATE: 60 BPM | DIASTOLIC BLOOD PRESSURE: 80 MMHG | BODY MASS INDEX: 26.68 KG/M2 | HEIGHT: 66 IN

## 2022-03-03 DIAGNOSIS — Z78.9 OTHER SPECIFIED HEALTH STATUS: ICD-10-CM

## 2022-03-03 DIAGNOSIS — Z82.49 FAMILY HISTORY OF ISCHEMIC HEART DISEASE AND OTHER DISEASES OF THE CIRCULATORY SYSTEM: ICD-10-CM

## 2022-03-03 PROCEDURE — 36415 COLL VENOUS BLD VENIPUNCTURE: CPT

## 2022-03-03 PROCEDURE — 93000 ELECTROCARDIOGRAM COMPLETE: CPT | Mod: 59

## 2022-03-03 PROCEDURE — G0439: CPT

## 2022-03-03 PROCEDURE — G0444 DEPRESSION SCREEN ANNUAL: CPT | Mod: 59

## 2022-03-03 NOTE — REVIEW OF SYSTEMS
[Negative] : Respiratory [Fever] : no fever [Chills] : no chills [Hearing Loss] : no hearing loss [Chest Pain] : no chest pain [Palpitations] : no palpitations [Abdominal Pain] : no abdominal pain [Nausea] : no nausea [Vomiting] : no vomiting [Heartburn] : no heartburn [Dysuria] : no dysuria [Hematuria] : no hematuria [Muscle Weakness] : no muscle weakness [Skin Rash] : no skin rash [Headache] : no headache [Dizziness] : no dizziness [Anxiety] : no anxiety [Depression] : no depression [Easy Bleeding] : no easy bleeding [FreeTextEntry3] : Last eye exam 1 yr ago [FreeTextEntry8] : no renal stones or disease [de-identified] : + Chronic tremors; bilateral tinnitus

## 2022-03-03 NOTE — HISTORY OF PRESENT ILLNESS
[FreeTextEntry1] : Here for Medicare wellness exam and follow-up of hyperlipidemia and hypertension [de-identified] : Hypercholesterolemia: Last LDL 49  Dec\par Major cardiovascular risk factors include AGE, MALE, HTN, CHOL. The LDL goal for this patient is <130 mg/dl.  Dietary modifications have included a low fat diet.  Exercise modifications have included WALKING several miles a week. Responses to the medications have been GOOD. The patient's adherence to the medication treatment is GOOD. The side effects of the medication include NONE; the patient specifically denies myalgias. His weight has CHANGED BY +2 MORE\par \par HTN- Compared to last visit the hypertension is AT GOAL. Lifestyle modifications that the patient has adopted include dietary sodium reduction, increasing physical activity, attempts at weight reduction. Responses to the medications has been good . Adverse effects of the medication include none . Patient denies chest pain , palpitations, headaches, but + occasional orthostatic dizziness. \par      Signs of abuse or neglect? No. Fall Risk/History of Falling No.  \par

## 2022-03-03 NOTE — ASSESSMENT
[FreeTextEntry1] : CPE- Up-to-date on health maintenance. Up-to-date on vaccinations. Urged weight loss through diet and regular aerobic exercise.\par \par Essential (primary) hypertension - I10 (Primary), Cont lisinopril-hydrochlorothiazide and low salt diet. Urged to increase physical activity and cont efforts at wt loss. Bp control is GOOD. \par \par Pure hyperglyceridemia - E78.1, Tg's high occasionally but no sx's sec to it and LDL remains ok. Follow. Continue atorvastatin and low cholesterol diet. Urged stronger efforts at weight loss. LDL goal is <130 at a minimum. Last LDL at goal  49  Dec\par \par essential tremors- continue propranolol and neuro follow up.  He does not feel it makes much of a difference on his tremors.  We discussed possibly decreasing his dose to 40 mg to see if it makes a difference.  If it does we will increase back to 80 mg daily; if it does not we would discontinue it and see if there is any difference.\par \par Erectile dysfunction- likely a candidate for medication but will refer to  per his request for evaluation\par \par \par \par \par

## 2022-03-03 NOTE — HEALTH RISK ASSESSMENT
[Fair] :  ~his/her~ mood as fair [Former] : Former [Yes] : Yes [4 or more  times a week (4 pts)] : 4 or more  times a week (4 points) [1 or 2 (0 pts)] : 1 or 2 (0 points) [Never (0 pts)] : Never (0 points) [No falls in past year] : Patient reported no falls in the past year [0] : 2) Feeling down, depressed, or hopeless: Not at all (0) [Patient reported colonoscopy was normal] : Patient reported colonoscopy was normal [None] : None [With Significant Other] : lives with significant other [# of Members in Household ___] :  household currently consist of [unfilled] member(s) [Retired] : retired [High School] : high school [] :  [# Of Children ___] : has [unfilled] children [Feels Safe at Home] : Feels safe at home [Smoke Detector] : smoke detector [Carbon Monoxide Detector] : carbon monoxide detector [Seat Belt] :  uses seat belt [YearQuit] : 1966 [de-identified] : walking [Guns at Home] : no guns at home [ColonoscopyDate] : 03/20 [FreeTextEntry2] :

## 2022-03-03 NOTE — PHYSICAL EXAM
[No Acute Distress] : no acute distress [Well Nourished] : well nourished [Well Developed] : well developed [Well-Appearing] : well-appearing [Normal Sclera/Conjunctiva] : normal sclera/conjunctiva [PERRL] : pupils equal round and reactive to light [EOMI] : extraocular movements intact [Normal Outer Ear/Nose] : the outer ears and nose were normal in appearance [Normal Oropharynx] : the oropharynx was normal [No JVD] : no jugular venous distention [No Lymphadenopathy] : no lymphadenopathy [Supple] : supple [Thyroid Normal, No Nodules] : the thyroid was normal and there were no nodules present [No Respiratory Distress] : no respiratory distress  [No Accessory Muscle Use] : no accessory muscle use [Clear to Auscultation] : lungs were clear to auscultation bilaterally [Normal Rate] : normal rate  [Regular Rhythm] : with a regular rhythm [Normal S1, S2] : normal S1 and S2 [No Murmur] : no murmur heard [No Carotid Bruits] : no carotid bruits [No Abdominal Bruit] : a ~M bruit was not heard ~T in the abdomen [No Varicosities] : no varicosities [Pedal Pulses Present] : the pedal pulses are present [No Edema] : there was no peripheral edema [No Palpable Aorta] : no palpable aorta [No Extremity Clubbing/Cyanosis] : no extremity clubbing/cyanosis [Soft] : abdomen soft [Non Tender] : non-tender [Non-distended] : non-distended [No Masses] : no abdominal mass palpated [No HSM] : no HSM [Normal Bowel Sounds] : normal bowel sounds [Normal Posterior Cervical Nodes] : no posterior cervical lymphadenopathy [Normal Anterior Cervical Nodes] : no anterior cervical lymphadenopathy [No CVA Tenderness] : no CVA  tenderness [No Spinal Tenderness] : no spinal tenderness [No Joint Swelling] : no joint swelling [Grossly Normal Strength/Tone] : grossly normal strength/tone [No Rash] : no rash [Coordination Grossly Intact] : coordination grossly intact [No Focal Deficits] : no focal deficits [Normal Gait] : normal gait [Deep Tendon Reflexes (DTR)] : deep tendon reflexes were 2+ and symmetric [Normal Affect] : the affect was normal [Normal Insight/Judgement] : insight and judgment were intact [de-identified] :  +sinus arrhythmia; no S4 [de-identified] : 1+ DTRs; +hand tremors

## 2022-03-04 LAB
ALBUMIN SERPL ELPH-MCNC: 4.7 G/DL
ALP BLD-CCNC: 55 U/L
ALT SERPL-CCNC: 36 U/L
ANION GAP SERPL CALC-SCNC: 14 MMOL/L
APPEARANCE: CLEAR
AST SERPL-CCNC: 22 U/L
BASOPHILS # BLD AUTO: 0.06 K/UL
BASOPHILS NFR BLD AUTO: 1 %
BILIRUB SERPL-MCNC: 0.6 MG/DL
BILIRUBIN URINE: NEGATIVE
BLOOD URINE: NEGATIVE
BUN SERPL-MCNC: 14 MG/DL
CALCIUM SERPL-MCNC: 10.1 MG/DL
CHLORIDE SERPL-SCNC: 102 MMOL/L
CHOLEST SERPL-MCNC: 163 MG/DL
CO2 SERPL-SCNC: 25 MMOL/L
COLOR: YELLOW
CREAT SERPL-MCNC: 1.02 MG/DL
EGFR: 78 ML/MIN/1.73M2
EOSINOPHIL # BLD AUTO: 0.11 K/UL
EOSINOPHIL NFR BLD AUTO: 1.8 %
GLUCOSE QUALITATIVE U: NEGATIVE
GLUCOSE SERPL-MCNC: 105 MG/DL
HCT VFR BLD CALC: 42.2 %
HDLC SERPL-MCNC: 46 MG/DL
HGB BLD-MCNC: 14.5 G/DL
IMM GRANULOCYTES NFR BLD AUTO: 0.3 %
KETONES URINE: NEGATIVE
LDLC SERPL CALC-MCNC: 61 MG/DL
LEUKOCYTE ESTERASE URINE: NEGATIVE
LYMPHOCYTES # BLD AUTO: 2.5 K/UL
LYMPHOCYTES NFR BLD AUTO: 41.7 %
MAN DIFF?: NORMAL
MCHC RBC-ENTMCNC: 31.7 PG
MCHC RBC-ENTMCNC: 34.4 GM/DL
MCV RBC AUTO: 92.1 FL
MONOCYTES # BLD AUTO: 0.67 K/UL
MONOCYTES NFR BLD AUTO: 11.2 %
NEUTROPHILS # BLD AUTO: 2.64 K/UL
NEUTROPHILS NFR BLD AUTO: 44 %
NITRITE URINE: NEGATIVE
NONHDLC SERPL-MCNC: 117 MG/DL
PH URINE: 7
PLATELET # BLD AUTO: 239 K/UL
POTASSIUM SERPL-SCNC: 4.3 MMOL/L
PROT SERPL-MCNC: 7 G/DL
PROTEIN URINE: NEGATIVE
PSA SERPL-MCNC: 0.72 NG/ML
RBC # BLD: 4.58 M/UL
RBC # FLD: 12.6 %
SODIUM SERPL-SCNC: 141 MMOL/L
SPECIFIC GRAVITY URINE: 1.02
TRIGL SERPL-MCNC: 280 MG/DL
TSH SERPL-ACNC: 0.68 UIU/ML
UROBILINOGEN URINE: NORMAL
WBC # FLD AUTO: 6 K/UL

## 2022-06-08 ENCOUNTER — APPOINTMENT (OUTPATIENT)
Dept: INTERNAL MEDICINE | Facility: CLINIC | Age: 72
End: 2022-06-08
Payer: MEDICARE

## 2022-06-08 VITALS
BODY MASS INDEX: 26.68 KG/M2 | HEIGHT: 66 IN | DIASTOLIC BLOOD PRESSURE: 70 MMHG | HEART RATE: 56 BPM | SYSTOLIC BLOOD PRESSURE: 110 MMHG | WEIGHT: 166 LBS

## 2022-06-08 PROCEDURE — 36415 COLL VENOUS BLD VENIPUNCTURE: CPT

## 2022-06-08 PROCEDURE — 99214 OFFICE O/P EST MOD 30 MIN: CPT | Mod: 25

## 2022-06-08 NOTE — ASSESSMENT
[FreeTextEntry1] : Essential (primary) hypertension - I10 (Primary), Cont lisinopril-hydrochlorothiazide and low salt diet. Urged to increase physical activity and cont efforts at wt loss. Bp control is GOOD. \par \par Pure hyperglyceridemia - E78.1, Tg's high occasionally but no sx's sec to it and LDL remains ok. Follow. Continue atorvastatin and low cholesterol diet. Urged stronger efforts at weight loss. LDL goal is <130 at a minimum. Last LDL at goal 61 \par \par essential tremors- continue propranolol and neuro follow up.  He tried to cut it down but his tremors got worse so he went back to his 80 mg dose\par \par Erectile dysfunction- likely a candidate for medication but will refer to  per his request for evaluation\par \par \par \par \par

## 2022-06-08 NOTE — PHYSICAL EXAM
[No JVD] : no jugular venous distention [Normal] : normal rate, regular rhythm, normal S1 and S2 and no murmur heard [No Carotid Bruits] : no carotid bruits [No Edema] : there was no peripheral edema [de-identified] : +sinus arrhythmia; no S4

## 2022-06-08 NOTE — HISTORY OF PRESENT ILLNESS
[FreeTextEntry1] : here for followup of hypertension, hypertriglyceridemia\par \par  [de-identified] : Hypercholesterolemia: Last LDL 61  Mar\par Major cardiovascular risk factors include AGE, MALE, HTN, CHOL. The LDL goal for this patient is <130 mg/dl.  Dietary modifications have included a low fat diet.  Exercise modifications have included WALKING several miles a week. Responses to the medications have been GOOD. The patient's adherence to the medication treatment is GOOD. The side effects of the medication include NONE; the patient specifically denies myalgias. His weight has CHANGED BY 0\par HTN- Compared to last visit the hypertension is AT GOAL. Lifestyle modifications that the patient has adopted include dietary sodium reduction, increasing physical activity, attempts at weight reduction. Responses to the medications has been good . Adverse effects of the medication include NONE. Patient denies chest pain , palpitations, headaches, but + occasional orthostatic dizziness. \par      Signs of abuse or neglect? No. Fall Risk/History of Falling No.  \par

## 2022-06-09 LAB
ALBUMIN SERPL ELPH-MCNC: 4.6 G/DL
ALP BLD-CCNC: 64 U/L
ALT SERPL-CCNC: 34 U/L
ANION GAP SERPL CALC-SCNC: 12 MMOL/L
AST SERPL-CCNC: 26 U/L
BILIRUB SERPL-MCNC: 0.6 MG/DL
BUN SERPL-MCNC: 15 MG/DL
CALCIUM SERPL-MCNC: 9.5 MG/DL
CHLORIDE SERPL-SCNC: 103 MMOL/L
CHOLEST SERPL-MCNC: 129 MG/DL
CO2 SERPL-SCNC: 26 MMOL/L
CREAT SERPL-MCNC: 1.04 MG/DL
EGFR: 76 ML/MIN/1.73M2
GLUCOSE SERPL-MCNC: 111 MG/DL
HDLC SERPL-MCNC: 42 MG/DL
LDLC SERPL CALC-MCNC: 54 MG/DL
NONHDLC SERPL-MCNC: 87 MG/DL
POTASSIUM SERPL-SCNC: 4.3 MMOL/L
PROT SERPL-MCNC: 6.7 G/DL
SODIUM SERPL-SCNC: 141 MMOL/L
TRIGL SERPL-MCNC: 165 MG/DL

## 2022-06-18 ENCOUNTER — NON-APPOINTMENT (OUTPATIENT)
Age: 72
End: 2022-06-18

## 2022-07-05 ENCOUNTER — APPOINTMENT (OUTPATIENT)
Dept: INTERNAL MEDICINE | Facility: CLINIC | Age: 72
End: 2022-07-05

## 2022-09-15 ENCOUNTER — APPOINTMENT (OUTPATIENT)
Dept: INTERNAL MEDICINE | Facility: CLINIC | Age: 72
End: 2022-09-15

## 2022-09-15 VITALS
SYSTOLIC BLOOD PRESSURE: 130 MMHG | BODY MASS INDEX: 27 KG/M2 | HEIGHT: 66 IN | DIASTOLIC BLOOD PRESSURE: 80 MMHG | WEIGHT: 168 LBS | HEART RATE: 64 BPM

## 2022-09-15 PROCEDURE — G0008: CPT

## 2022-09-15 PROCEDURE — 36415 COLL VENOUS BLD VENIPUNCTURE: CPT

## 2022-09-15 PROCEDURE — 90662 IIV NO PRSV INCREASED AG IM: CPT

## 2022-09-15 PROCEDURE — 99214 OFFICE O/P EST MOD 30 MIN: CPT | Mod: 25

## 2022-09-15 NOTE — HISTORY OF PRESENT ILLNESS
[FreeTextEntry1] : here for followup of hypertension, hypertriglyceridemia\par \par  [de-identified] : Hypercholesterolemia: Last LDL 54  Selam\par Major cardiovascular risk factors include AGE, MALE, HTN, CHOL. The LDL goal for this patient is <130 mg/dl.  Dietary modifications have included a low fat diet.  Exercise modifications have included WALKING several miles a week. Responses to the medications have been GOOD. The patient's adherence to the medication treatment is GOOD. The side effects of the medication include NONE; the patient specifically denies myalgias. His weight has CHANGED BY 0\par HTN- Compared to last visit the hypertension is AT GOAL. Lifestyle modifications that the patient has adopted include dietary sodium reduction, increasing physical activity, attempts at weight reduction. Responses to the medications has been good . Adverse effects of the medication include NONE. Patient denies chest pain , palpitations, headaches, but + occasional orthostatic dizziness. \par      Signs of abuse or neglect? No. Fall Risk/History of Falling No.  \par

## 2022-09-15 NOTE — ASSESSMENT
[FreeTextEntry1] : Essential (primary) hypertension - I10 (Primary), Cont lisinopril-hydrochlorothiazide and low salt diet. Urged to increase physical activity and cont efforts at wt loss. Bp control is GOOD. \par \par Pure hyperglyceridemia - E78.1, Tg's high occasionally but no sx's sec to it and LDL remains ok. Follow. Continue atorvastatin and low cholesterol diet. Urged stronger efforts at weight loss. LDL goal is <130 at a minimum. Last LDL at goal  54  June\par \par essential tremors- continue propranolol and neuro follow up.  He tried to cut it down but his tremors got worse so he went back to his 80 mg dose\par \par Erectile dysfunction- likely a candidate for medication but will refer to  per his request for evaluation\par \par Impaired fasting glucose– we will check A1c as last glucose more elevated\par \par Risks/benefits of Flu vaccine d/w patient and patient understands and accepts.\par \par \par

## 2022-09-19 LAB
ALBUMIN SERPL ELPH-MCNC: 4.5 G/DL
ALP BLD-CCNC: 63 U/L
ALT SERPL-CCNC: 22 U/L
ANION GAP SERPL CALC-SCNC: 17 MMOL/L
AST SERPL-CCNC: 17 U/L
BILIRUB SERPL-MCNC: 0.4 MG/DL
BUN SERPL-MCNC: 19 MG/DL
CALCIUM SERPL-MCNC: 10 MG/DL
CHLORIDE SERPL-SCNC: 106 MMOL/L
CHOLEST SERPL-MCNC: 158 MG/DL
CO2 SERPL-SCNC: 22 MMOL/L
CREAT SERPL-MCNC: 0.99 MG/DL
EGFR: 80 ML/MIN/1.73M2
ESTIMATED AVERAGE GLUCOSE: 117 MG/DL
GLUCOSE SERPL-MCNC: 101 MG/DL
HBA1C MFR BLD HPLC: 5.7 %
HDLC SERPL-MCNC: 45 MG/DL
LDLC SERPL CALC-MCNC: 62 MG/DL
NONHDLC SERPL-MCNC: 112 MG/DL
POTASSIUM SERPL-SCNC: 4.3 MMOL/L
PROT SERPL-MCNC: 6.9 G/DL
SODIUM SERPL-SCNC: 145 MMOL/L
TRIGL SERPL-MCNC: 249 MG/DL

## 2022-10-04 ENCOUNTER — RESULT REVIEW (OUTPATIENT)
Age: 72
End: 2022-10-04

## 2022-10-04 ENCOUNTER — NON-APPOINTMENT (OUTPATIENT)
Age: 72
End: 2022-10-04

## 2022-10-05 ENCOUNTER — RESULT REVIEW (OUTPATIENT)
Age: 72
End: 2022-10-05

## 2022-10-05 ENCOUNTER — TRANSCRIPTION ENCOUNTER (OUTPATIENT)
Age: 72
End: 2022-10-05

## 2022-10-14 ENCOUNTER — NON-APPOINTMENT (OUTPATIENT)
Age: 72
End: 2022-10-14

## 2022-10-19 ENCOUNTER — APPOINTMENT (OUTPATIENT)
Dept: SURGERY | Facility: CLINIC | Age: 72
End: 2022-10-19

## 2022-10-19 VITALS
TEMPERATURE: 97.5 F | DIASTOLIC BLOOD PRESSURE: 81 MMHG | BODY MASS INDEX: 27.66 KG/M2 | RESPIRATION RATE: 18 BRPM | SYSTOLIC BLOOD PRESSURE: 123 MMHG | HEART RATE: 66 BPM | WEIGHT: 166 LBS | HEIGHT: 65 IN

## 2022-10-19 PROCEDURE — 99024 POSTOP FOLLOW-UP VISIT: CPT

## 2022-10-19 NOTE — PHYSICAL EXAM
[Respiratory Effort] : normal respiratory effort [Normal Rate and Rhythm] : normal rate and rhythm [Alert] : alert [Calm] : calm [de-identified] : NAD, well-appearing [de-identified] : Anicteric [de-identified] : soft, nondistended, nontender with well-healing incisions c.d.i

## 2022-10-19 NOTE — ASSESSMENT
[FreeTextEntry1] : 71 yo M s/p robotic-assisted cholecystectomy. Recovering appropriately without any complaints. \par \par Gradual return to normal activity and diet.\par Follow up as needed.

## 2022-10-19 NOTE — CONSULT LETTER
[Dear  ___] : Dear  [unfilled], [Courtesy Letter:] : I had the pleasure of seeing your patient, [unfilled], in my office today. [Consult Closing:] : Thank you very much for allowing me to participate in the care of this patient.  If you have any questions, please do not hesitate to contact me. [Sincerely,] : Sincerely, [FreeTextEntry1] : Enclosed please find my operative report, pathology report and postop visit note.  [FreeTextEntry3] : Nieves Quintanilla MD

## 2022-10-19 NOTE — HISTORY OF PRESENT ILLNESS
[de-identified] : Patient returns s/p recent hospital admission for acute cholecystitis who is now s/p robotic-assisted cholecystectomy. He is doing well without pain, fevers, chills, nausea or vomiting. His incisions are healing well. He has some increased BMs but denies diarrhea or discomfort. Able to tolerate all po. Pathology showed acute necrotizing cholecystitis.

## 2022-11-21 ENCOUNTER — APPOINTMENT (OUTPATIENT)
Dept: INTERNAL MEDICINE | Facility: CLINIC | Age: 72
End: 2022-11-21

## 2022-11-21 ENCOUNTER — NON-APPOINTMENT (OUTPATIENT)
Age: 72
End: 2022-11-21

## 2022-11-21 VITALS
BODY MASS INDEX: 27.49 KG/M2 | SYSTOLIC BLOOD PRESSURE: 130 MMHG | WEIGHT: 165 LBS | HEIGHT: 65 IN | DIASTOLIC BLOOD PRESSURE: 70 MMHG | HEART RATE: 68 BPM

## 2022-11-21 PROCEDURE — 36415 COLL VENOUS BLD VENIPUNCTURE: CPT

## 2022-11-21 PROCEDURE — 99215 OFFICE O/P EST HI 40 MIN: CPT | Mod: 25

## 2022-11-21 PROCEDURE — 93000 ELECTROCARDIOGRAM COMPLETE: CPT | Mod: 59

## 2022-11-21 NOTE — REVIEW OF SYSTEMS
[Negative] : Respiratory [Vision Problems] : vision problems [Fever] : no fever [Chills] : no chills [Hearing Loss] : no hearing loss [Chest Pain] : no chest pain [Palpitations] : no palpitations [Abdominal Pain] : no abdominal pain [Nausea] : no nausea [Vomiting] : no vomiting [Heartburn] : no heartburn [Dysuria] : no dysuria [Hematuria] : no hematuria [Muscle Weakness] : no muscle weakness [Skin Rash] : no skin rash [Headache] : no headache [Dizziness] : no dizziness [Anxiety] : no anxiety [Depression] : no depression [Easy Bleeding] : no easy bleeding [FreeTextEntry3] : Last eye exam 2 months ago [FreeTextEntry8] : no renal stones or disease [de-identified] : + Chronic tremors; bilateral tinnitus

## 2022-11-21 NOTE — HEALTH RISK ASSESSMENT
[Fair] :  ~his/her~ mood as fair [Former] : Former [Yes] : Yes [4 or more  times a week (4 pts)] : 4 or more  times a week (4 points) [1 or 2 (0 pts)] : 1 or 2 (0 points) [Never (0 pts)] : Never (0 points) [No falls in past year] : Patient reported no falls in the past year [0] : 2) Feeling down, depressed, or hopeless: Not at all (0) [Patient reported colonoscopy was normal] : Patient reported colonoscopy was normal [None] : None [With Significant Other] : lives with significant other [# of Members in Household ___] :  household currently consist of [unfilled] member(s) [Retired] : retired [High School] : high school [] :  [# Of Children ___] : has [unfilled] children [Feels Safe at Home] : Feels safe at home [Smoke Detector] : smoke detector [Carbon Monoxide Detector] : carbon monoxide detector [Seat Belt] :  uses seat belt [YearQuit] : 1966 [de-identified] : walking [Guns at Home] : no guns at home [ColonoscopyDate] : 03/20 [FreeTextEntry2] :

## 2022-11-21 NOTE — ASSESSMENT
[ECG] : ECG [FreeTextEntry1] : Preop prior to left cataract surgery– healthy man in apparently stable condition. We will check labs and EKG prior to final clearance. \par \par Essential (primary) hypertension - I10 (Primary), Cont lisinopril-hydrochlorothiazide and low salt diet. Urged to increase physical activity and cont efforts at wt loss. Bp control is GOOD. \par \par Pure hyperglyceridemia - E78.1, Tg's high occasionally but no sx's sec to it and LDL remains ok. Follow. Continue atorvastatin and low cholesterol diet. Urged stronger efforts at weight loss. LDL goal is <130 at a minimum. Last LDL at goal  62  Sept\par \par essential tremors- continue propranolol and neuro follow up.  He does not feel it makes much of a difference on his tremors. \par \par Erectile dysfunction- likely a candidate for medication but referred to  per his request for evaluation.\par \par \par \par \par

## 2022-11-21 NOTE — HISTORY OF PRESENT ILLNESS
[No Pertinent Cardiac History] : no history of aortic stenosis, atrial fibrillation, coronary artery disease, recent myocardial infarction, or implantable device/pacemaker [No Pertinent Pulmonary History] : no history of asthma, COPD, sleep apnea, or smoking [No Adverse Anesthesia Reaction] : no adverse anesthesia reaction in self or family member [(Patient denies any chest pain, claudication, dyspnea on exertion, orthopnea, palpitations or syncope)] : Patient denies any chest pain, claudication, dyspnea on exertion, orthopnea, palpitations or syncope [Chronic Anticoagulation] : no chronic anticoagulation [Chronic Kidney Disease] : no chronic kidney disease [Diabetes] : no diabetes [FreeTextEntry2] : 12/5/2022 [FreeTextEntry3] : RENÉE Rangel [FreeTextEntry4] : Feels well, no complaints. [FreeTextEntry1] : Here for Medicare wellness exam and follow-up of hyperlipidemia and hypertension [de-identified] : Hypercholesterolemia: Last LDL 49  Dec\par Major cardiovascular risk factors include AGE, MALE, HTN, CHOL. The LDL goal for this patient is <130 mg/dl.  Dietary modifications have included a low fat diet.  Exercise modifications have included WALKING several miles a week. Responses to the medications have been GOOD. The patient's adherence to the medication treatment is GOOD. The side effects of the medication include NONE; the patient specifically denies myalgias. His weight has CHANGED BY +2 MORE\par \par HTN- Compared to last visit the hypertension is AT GOAL. Lifestyle modifications that the patient has adopted include dietary sodium reduction, increasing physical activity, attempts at weight reduction. Responses to the medications has been good . Adverse effects of the medication include none . Patient denies chest pain , palpitations, headaches, but + occasional orthostatic dizziness. \par      Signs of abuse or neglect? No. Fall Risk/History of Falling No.  \par

## 2022-11-21 NOTE — PHYSICAL EXAM
[No Acute Distress] : no acute distress [Well Nourished] : well nourished [Well Developed] : well developed [Well-Appearing] : well-appearing [Normal Sclera/Conjunctiva] : normal sclera/conjunctiva [PERRL] : pupils equal round and reactive to light [EOMI] : extraocular movements intact [Normal Outer Ear/Nose] : the outer ears and nose were normal in appearance [Normal Oropharynx] : the oropharynx was normal [No JVD] : no jugular venous distention [No Lymphadenopathy] : no lymphadenopathy [Supple] : supple [Thyroid Normal, No Nodules] : the thyroid was normal and there were no nodules present [No Respiratory Distress] : no respiratory distress  [No Accessory Muscle Use] : no accessory muscle use [Clear to Auscultation] : lungs were clear to auscultation bilaterally [Normal Rate] : normal rate  [Regular Rhythm] : with a regular rhythm [Normal S1, S2] : normal S1 and S2 [No Murmur] : no murmur heard [No Carotid Bruits] : no carotid bruits [No Abdominal Bruit] : a ~M bruit was not heard ~T in the abdomen [No Varicosities] : no varicosities [Pedal Pulses Present] : the pedal pulses are present [No Edema] : there was no peripheral edema [No Palpable Aorta] : no palpable aorta [No Extremity Clubbing/Cyanosis] : no extremity clubbing/cyanosis [Soft] : abdomen soft [Non Tender] : non-tender [Non-distended] : non-distended [No Masses] : no abdominal mass palpated [No HSM] : no HSM [Normal Bowel Sounds] : normal bowel sounds [Normal Posterior Cervical Nodes] : no posterior cervical lymphadenopathy [Normal Anterior Cervical Nodes] : no anterior cervical lymphadenopathy [No CVA Tenderness] : no CVA  tenderness [No Spinal Tenderness] : no spinal tenderness [No Joint Swelling] : no joint swelling [Grossly Normal Strength/Tone] : grossly normal strength/tone [No Rash] : no rash [Coordination Grossly Intact] : coordination grossly intact [No Focal Deficits] : no focal deficits [Normal Gait] : normal gait [Deep Tendon Reflexes (DTR)] : deep tendon reflexes were 2+ and symmetric [Normal Affect] : the affect was normal [Normal Insight/Judgement] : insight and judgment were intact [de-identified] :  +sinus arrhythmia; no S4 [de-identified] : 1+ DTRs; +hand tremors

## 2022-11-22 LAB
ALBUMIN SERPL ELPH-MCNC: 4.5 G/DL
ALP BLD-CCNC: 63 U/L
ALT SERPL-CCNC: 26 U/L
ANION GAP SERPL CALC-SCNC: 13 MMOL/L
AST SERPL-CCNC: 16 U/L
BASOPHILS # BLD AUTO: 0.04 K/UL
BASOPHILS NFR BLD AUTO: 0.6 %
BILIRUB SERPL-MCNC: 0.4 MG/DL
BUN SERPL-MCNC: 14 MG/DL
CALCIUM SERPL-MCNC: 9.9 MG/DL
CHLORIDE SERPL-SCNC: 100 MMOL/L
CHOLEST SERPL-MCNC: 166 MG/DL
CO2 SERPL-SCNC: 26 MMOL/L
CREAT SERPL-MCNC: 0.85 MG/DL
EGFR: 91 ML/MIN/1.73M2
EOSINOPHIL # BLD AUTO: 0.15 K/UL
EOSINOPHIL NFR BLD AUTO: 2.4 %
ESTIMATED AVERAGE GLUCOSE: 114 MG/DL
GLUCOSE SERPL-MCNC: 108 MG/DL
HBA1C MFR BLD HPLC: 5.6 %
HCT VFR BLD CALC: 39.8 %
HDLC SERPL-MCNC: 41 MG/DL
HGB BLD-MCNC: 13.5 G/DL
IMM GRANULOCYTES NFR BLD AUTO: 0.5 %
LDLC SERPL CALC-MCNC: NORMAL MG/DL
LYMPHOCYTES # BLD AUTO: 2.84 K/UL
LYMPHOCYTES NFR BLD AUTO: 44.6 %
MAN DIFF?: NORMAL
MCHC RBC-ENTMCNC: 31.3 PG
MCHC RBC-ENTMCNC: 33.9 GM/DL
MCV RBC AUTO: 92.1 FL
MONOCYTES # BLD AUTO: 0.68 K/UL
MONOCYTES NFR BLD AUTO: 10.7 %
NEUTROPHILS # BLD AUTO: 2.63 K/UL
NEUTROPHILS NFR BLD AUTO: 41.2 %
NONHDLC SERPL-MCNC: 125 MG/DL
PLATELET # BLD AUTO: 263 K/UL
POTASSIUM SERPL-SCNC: 4 MMOL/L
PROT SERPL-MCNC: 7 G/DL
RBC # BLD: 4.32 M/UL
RBC # FLD: 13.2 %
SODIUM SERPL-SCNC: 139 MMOL/L
TRIGL SERPL-MCNC: 400 MG/DL
WBC # FLD AUTO: 6.37 K/UL

## 2023-02-27 ENCOUNTER — APPOINTMENT (OUTPATIENT)
Dept: INTERNAL MEDICINE | Facility: CLINIC | Age: 73
End: 2023-02-27
Payer: MEDICARE

## 2023-02-27 VITALS
BODY MASS INDEX: 27.32 KG/M2 | HEIGHT: 65 IN | DIASTOLIC BLOOD PRESSURE: 70 MMHG | SYSTOLIC BLOOD PRESSURE: 118 MMHG | HEART RATE: 68 BPM | WEIGHT: 164 LBS

## 2023-02-27 PROCEDURE — 36415 COLL VENOUS BLD VENIPUNCTURE: CPT

## 2023-02-27 PROCEDURE — 99214 OFFICE O/P EST MOD 30 MIN: CPT | Mod: 25

## 2023-02-27 NOTE — ASSESSMENT
[FreeTextEntry1] : Essential (primary) hypertension - I10 (Primary), Cont lisinopril-hydrochlorothiazide and low salt diet. Urged to increase physical activity and cont efforts at wt loss. Bp control is GOOD. \par \par Pure hyperglyceridemia - E78.1, Tg's high occasionally but no sx's sec to it and LDL remains ok. Follow. Continue atorvastatin and low cholesterol diet. Urged stronger efforts at weight loss. LDL goal is <130 at a minimum. Last LDL at goal ? - \par \par essential tremors- continue propranolol ER 80 mg daily and neuro follow up.  \par \par Erectile dysfunction- likely a candidate for medication but referred previously to  per his request for evaluation\par \par \par

## 2023-02-27 NOTE — PHYSICAL EXAM
[No JVD] : no jugular venous distention [Normal] : normal rate, regular rhythm, normal S1 and S2 and no murmur heard [No Carotid Bruits] : no carotid bruits [No Edema] : there was no peripheral edema [de-identified] : NO XANTHELASMAS [de-identified] : +sinus arrhythmia; no S4

## 2023-02-27 NOTE — HISTORY OF PRESENT ILLNESS
[FreeTextEntry1] : here for followup of hypertension, hypertriglyceridemia\par had successful left cataract surgery\par  [de-identified] : Hypercholesterolemia: Last LDL -  Nov\par Major cardiovascular risk factors include AGE, MALE, HTN, CHOL. The LDL goal for this patient is <130 mg/dl.  Dietary modifications have included a low fat diet.  Exercise modifications have included WALKING several miles a week. Responses to the medications have been GOOD. The patient's adherence to the medication treatment is GOOD. The side effects of the medication include NONE; the patient specifically denies myalgias. His weight has CHANGED BY -1\par HTN- Compared to last visit the hypertension is AT GOAL. Lifestyle modifications that the patient has adopted include dietary sodium reduction, increasing physical activity, attempts at weight reduction. Responses to the medications has been good . Adverse effects of the medication include NONE. Patient denies chest pain , palpitations, headaches, but + occasional orthostatic dizziness. \par      Signs of abuse or neglect? No. Fall Risk/History of Falling No.  \par

## 2023-02-28 LAB
ALBUMIN SERPL ELPH-MCNC: 4.7 G/DL
ALP BLD-CCNC: 63 U/L
ALT SERPL-CCNC: 28 U/L
ANION GAP SERPL CALC-SCNC: 14 MMOL/L
AST SERPL-CCNC: 19 U/L
BILIRUB SERPL-MCNC: 0.5 MG/DL
BUN SERPL-MCNC: 17 MG/DL
CALCIUM SERPL-MCNC: 10.1 MG/DL
CHLORIDE SERPL-SCNC: 103 MMOL/L
CHOLEST SERPL-MCNC: 177 MG/DL
CO2 SERPL-SCNC: 26 MMOL/L
CREAT SERPL-MCNC: 0.96 MG/DL
EGFR: 83 ML/MIN/1.73M2
GLUCOSE SERPL-MCNC: 93 MG/DL
HDLC SERPL-MCNC: 44 MG/DL
LDLC SERPL CALC-MCNC: 75 MG/DL
NONHDLC SERPL-MCNC: 133 MG/DL
POTASSIUM SERPL-SCNC: 4.4 MMOL/L
PROT SERPL-MCNC: 6.9 G/DL
SODIUM SERPL-SCNC: 143 MMOL/L
TRIGL SERPL-MCNC: 289 MG/DL

## 2023-03-09 NOTE — REVIEW OF SYSTEMS
[Negative] : Respiratory [Fever] : no fever [Chills] : no chills [Chest Pain] : no chest pain [Palpitations] : no palpitations [Headache] : no headache [Dizziness] : no dizziness Non family member

## 2023-05-25 ENCOUNTER — APPOINTMENT (OUTPATIENT)
Dept: INTERNAL MEDICINE | Facility: CLINIC | Age: 73
End: 2023-05-25
Payer: MEDICARE

## 2023-05-25 VITALS
WEIGHT: 166 LBS | HEART RATE: 62 BPM | SYSTOLIC BLOOD PRESSURE: 122 MMHG | HEIGHT: 65 IN | BODY MASS INDEX: 27.66 KG/M2 | DIASTOLIC BLOOD PRESSURE: 80 MMHG

## 2023-05-25 PROCEDURE — 36415 COLL VENOUS BLD VENIPUNCTURE: CPT

## 2023-05-25 PROCEDURE — 99214 OFFICE O/P EST MOD 30 MIN: CPT | Mod: 25

## 2023-05-25 NOTE — HEALTH RISK ASSESSMENT
[Yes] : Yes [4 or more  times a week (4 pts)] : 4 or more  times a week (4 points) [3 or 4 (1 pt)] : 3 or 4  (1 point) [Never (0 pts)] : Never (0 points) [No] : In the past 12 months have you used drugs other than those required for medical reasons? No [Audit-CScore] : 5 [Former] : Former [5-9] : 5-9 [> 15 Years] : > 15 Years

## 2023-05-25 NOTE — HISTORY OF PRESENT ILLNESS
[FreeTextEntry1] : here for followup of hypertension, hypertriglyceridemia\par  [de-identified] : Hypercholesterolemia: Last LDL 75   Feb\par Major cardiovascular risk factors include AGE, MALE, HTN, CHOL. The LDL goal for this patient is <130 mg/dl.  Dietary modifications have included a low fat diet.  Exercise modifications have included WALKING several miles a week. Responses to the medications have been GOOD. The patient's adherence to the medication treatment is GOOD. The side effects of the medication include NONE; the patient specifically denies myalgias. His weight has CHANGED BY -1\par HTN- Compared to last visit the hypertension is AT GOAL. Lifestyle modifications that the patient has adopted include dietary sodium reduction, increasing physical activity, attempts at weight reduction. Responses to the medications has been good . Adverse effects of the medication include NONE. Patient denies chest pain , palpitations, headaches, but + occasional orthostatic dizziness. \par      Signs of abuse or neglect? No. Fall Risk/History of Falling No.  \par

## 2023-05-25 NOTE — PHYSICAL EXAM
[No JVD] : no jugular venous distention [Normal] : normal rate, regular rhythm, normal S1 and S2 and no murmur heard [No Carotid Bruits] : no carotid bruits [No Edema] : there was no peripheral edema [de-identified] : NO XANTHELASMAS [de-identified] : +sinus arrhythmia; no S4

## 2023-05-25 NOTE — ASSESSMENT
[FreeTextEntry1] : Essential (primary) hypertension - I10 (Primary), Cont lisinopril-hydrochlorothiazide and low salt diet. Urged to increase physical activity and cont efforts at wt loss. Bp control is GOOD. \par \par Pure hyperglyceridemia - E78.1, Tg's high occasionally but no sx's sec to it and LDL remains ok. Follow. Continue atorvastatin and low cholesterol diet. Urged stronger efforts at weight loss. LDL goal is <100 at a minimum. Last LDL at goal 75  Feb\par \par essential tremors- continue propranolol ER 80 mg daily and neuro follow up.  \par \par Erectile dysfunction- likely a candidate for medication but referred previously to  per his request for evaluation\par \par \par

## 2023-05-30 LAB
ALBUMIN SERPL ELPH-MCNC: 4.7 G/DL
ALP BLD-CCNC: 69 U/L
ALT SERPL-CCNC: 27 U/L
ANION GAP SERPL CALC-SCNC: 12 MMOL/L
AST SERPL-CCNC: 21 U/L
BILIRUB SERPL-MCNC: 0.6 MG/DL
BUN SERPL-MCNC: 18 MG/DL
CALCIUM SERPL-MCNC: 10.3 MG/DL
CHLORIDE SERPL-SCNC: 103 MMOL/L
CHOLEST SERPL-MCNC: 153 MG/DL
CO2 SERPL-SCNC: 26 MMOL/L
CREAT SERPL-MCNC: 1.1 MG/DL
EGFR: 70 ML/MIN/1.73M2
GLUCOSE SERPL-MCNC: 114 MG/DL
HDLC SERPL-MCNC: 42 MG/DL
LDLC SERPL CALC-MCNC: 59 MG/DL
NONHDLC SERPL-MCNC: 111 MG/DL
POTASSIUM SERPL-SCNC: 5.3 MMOL/L
PROT SERPL-MCNC: 7.2 G/DL
SODIUM SERPL-SCNC: 141 MMOL/L
TRIGL SERPL-MCNC: 260 MG/DL

## 2023-08-16 ENCOUNTER — NON-APPOINTMENT (OUTPATIENT)
Age: 73
End: 2023-08-16

## 2023-08-21 ENCOUNTER — APPOINTMENT (OUTPATIENT)
Dept: INTERNAL MEDICINE | Facility: CLINIC | Age: 73
End: 2023-08-21
Payer: MEDICARE

## 2023-08-21 VITALS
SYSTOLIC BLOOD PRESSURE: 130 MMHG | WEIGHT: 168 LBS | HEART RATE: 77 BPM | BODY MASS INDEX: 27.99 KG/M2 | DIASTOLIC BLOOD PRESSURE: 70 MMHG | HEIGHT: 65 IN

## 2023-08-21 PROCEDURE — 99214 OFFICE O/P EST MOD 30 MIN: CPT | Mod: 25

## 2023-08-21 PROCEDURE — 36415 COLL VENOUS BLD VENIPUNCTURE: CPT

## 2023-08-21 RX ORDER — PROPRANOLOL HYDROCHLORIDE 80 MG/1
80 CAPSULE, EXTENDED RELEASE ORAL
Qty: 90 | Refills: 0 | Status: ACTIVE | COMMUNITY
Start: 2022-10-18

## 2023-08-21 NOTE — REVIEW OF SYSTEMS
[Negative] : Respiratory [Chest Pain] : no chest pain [Palpitations] : no palpitations [Headache] : no headache [Dizziness] : no dizziness

## 2023-08-21 NOTE — ASSESSMENT
[FreeTextEntry1] : Essential (primary) hypertension - I10 (Primary), Cont lisinopril-hydrochlorothiazide and low salt diet. Urged to increase physical activity and cont efforts at wt loss. Bp control is GOOD.   Pure hyperglyceridemia - E78.1, Tg's high occasionally but no sx's sec to it and LDL remains ok. Follow. Continue atorvastatin and low cholesterol diet. Urged stronger efforts at weight loss. LDL goal is <100 at a minimum. Last LDL at goal 59  May  essential tremors- continue propranolol ER 80 mg daily and neuro follow up.  Propranolol helps but not as much as he would like it to.  Erectile dysfunction- likely a candidate for medication but referred previously to  per his request for evaluation  Check screening PSA as last one (normal) was a year and a half ago.

## 2023-08-21 NOTE — HISTORY OF PRESENT ILLNESS
[FreeTextEntry1] : here for followup of hypertension, hypertriglyceridemia  [de-identified] : Hypercholesterolemia: Last LDL 59  May Major cardiovascular risk factors include AGE, MALE, HTN, CHOL. The LDL goal for this patient is <130 mg/dl.  Dietary modifications have included a low fat diet.  Exercise modifications have included WALKING several miles a week. Responses to the medications have been GOOD. The patient's adherence to the medication treatment is GOOD. The side effects of the medication include NONE; the patient specifically denies myalgias. His weight has CHANGED BY +2 HTN- Compared to last visit the hypertension is AT GOAL. Lifestyle modifications that the patient has adopted include dietary sodium reduction, increasing physical activity, attempts at weight reduction. Responses to the medications has been good . Adverse effects of the medication include NONE. Patient denies chest pain , palpitations, headaches, but + occasional orthostatic dizziness.       Signs of abuse or neglect? No. Fall Risk/History of Falling No.

## 2023-08-21 NOTE — HEALTH RISK ASSESSMENT
[Yes] : Yes [4 or more  times a week (4 pts)] : 4 or more  times a week (4 points) [3 or 4 (1 pt)] : 3 or 4  (1 point) [Never (0 pts)] : Never (0 points) [No] : In the past 12 months have you used drugs other than those required for medical reasons? No [Former] : Former [5-9] : 5-9 [> 15 Years] : > 15 Years [Audit-CScore] : 5

## 2023-08-21 NOTE — PHYSICAL EXAM
[No JVD] : no jugular venous distention [Normal] : normal rate, regular rhythm, normal S1 and S2 and no murmur heard [No Carotid Bruits] : no carotid bruits [No Edema] : there was no peripheral edema [de-identified] : NO XANTHELASMAS [de-identified] : +sinus arrhythmia; no S4 [de-identified] : +bilaterally fine hand tremor

## 2023-08-22 LAB
ALBUMIN SERPL ELPH-MCNC: 4.5 G/DL
ALP BLD-CCNC: 58 U/L
ALT SERPL-CCNC: 27 U/L
ANION GAP SERPL CALC-SCNC: 11 MMOL/L
AST SERPL-CCNC: 21 U/L
BILIRUB SERPL-MCNC: 0.6 MG/DL
BUN SERPL-MCNC: 16 MG/DL
CALCIUM SERPL-MCNC: 9.9 MG/DL
CHLORIDE SERPL-SCNC: 104 MMOL/L
CHOLEST SERPL-MCNC: 175 MG/DL
CO2 SERPL-SCNC: 28 MMOL/L
CREAT SERPL-MCNC: 1 MG/DL
EGFR: 78 ML/MIN/1.73M2
GLUCOSE SERPL-MCNC: 104 MG/DL
HDLC SERPL-MCNC: 44 MG/DL
LDLC SERPL CALC-MCNC: 74 MG/DL
NONHDLC SERPL-MCNC: 131 MG/DL
POTASSIUM SERPL-SCNC: 5.3 MMOL/L
PROT SERPL-MCNC: 6.9 G/DL
PSA SERPL-MCNC: 0.8 NG/ML
SODIUM SERPL-SCNC: 143 MMOL/L
TRIGL SERPL-MCNC: 362 MG/DL

## 2023-09-02 ENCOUNTER — NON-APPOINTMENT (OUTPATIENT)
Age: 73
End: 2023-09-02

## 2023-09-20 ENCOUNTER — APPOINTMENT (OUTPATIENT)
Dept: PULMONOLOGY | Facility: CLINIC | Age: 73
End: 2023-09-20
Payer: MEDICARE

## 2023-09-20 VITALS
HEART RATE: 75 BPM | SYSTOLIC BLOOD PRESSURE: 130 MMHG | BODY MASS INDEX: 27.99 KG/M2 | DIASTOLIC BLOOD PRESSURE: 70 MMHG | WEIGHT: 168 LBS | HEIGHT: 65 IN

## 2023-09-20 DIAGNOSIS — G51.0 BELL'S PALSY: ICD-10-CM

## 2023-09-20 PROCEDURE — 99203 OFFICE O/P NEW LOW 30 MIN: CPT

## 2023-10-04 ENCOUNTER — NON-APPOINTMENT (OUTPATIENT)
Age: 73
End: 2023-10-04

## 2023-10-26 ENCOUNTER — APPOINTMENT (OUTPATIENT)
Dept: GASTROENTEROLOGY | Facility: CLINIC | Age: 73
End: 2023-10-26
Payer: MEDICARE

## 2023-10-26 VITALS
HEIGHT: 65 IN | SYSTOLIC BLOOD PRESSURE: 116 MMHG | HEART RATE: 71 BPM | WEIGHT: 167 LBS | BODY MASS INDEX: 27.82 KG/M2 | DIASTOLIC BLOOD PRESSURE: 67 MMHG

## 2023-10-26 PROCEDURE — 99204 OFFICE O/P NEW MOD 45 MIN: CPT

## 2023-11-02 NOTE — HISTORY OF PRESENT ILLNESS
Pt may be referring to oxybutynin (see encounger 6/22/23)  It looks like I missed that medication needed to be sent in. If that's what she is referring to then we can send    [de-identified] :  Compared to last visit the hypertension is WORSE. Lifestyle modifications that the patient has adopted include dietary sodium reduction , increasing physical activity , attempts at weight reduction . Responses to the medications has been good . Adverse effects of the medication include none . Patient denies chest pain , palpitations, headaches, but + occasional orthostatic dizziness. \par      Signs of abuse or neglect? No. Fall Risk/History of Falling No. \par      no 3 p's, nocturia. Last A1c 5.3;   Nov [FreeTextEntry1] : here for followup of hypertension\par med for tremors increased but makes him sedated

## 2023-12-13 ENCOUNTER — APPOINTMENT (OUTPATIENT)
Dept: INTERNAL MEDICINE | Facility: CLINIC | Age: 73
End: 2023-12-13
Payer: MEDICARE

## 2023-12-13 VITALS
SYSTOLIC BLOOD PRESSURE: 120 MMHG | DIASTOLIC BLOOD PRESSURE: 70 MMHG | HEIGHT: 65 IN | BODY MASS INDEX: 27.99 KG/M2 | HEART RATE: 68 BPM | WEIGHT: 168 LBS

## 2023-12-13 DIAGNOSIS — G47.33 OBSTRUCTIVE SLEEP APNEA (ADULT) (PEDIATRIC): ICD-10-CM

## 2023-12-13 PROCEDURE — 99214 OFFICE O/P EST MOD 30 MIN: CPT | Mod: 25

## 2023-12-13 PROCEDURE — 36415 COLL VENOUS BLD VENIPUNCTURE: CPT

## 2023-12-13 PROCEDURE — G0008: CPT

## 2023-12-13 PROCEDURE — 90662 IIV NO PRSV INCREASED AG IM: CPT

## 2023-12-13 PROCEDURE — G0009: CPT

## 2023-12-13 PROCEDURE — 90677 PCV20 VACCINE IM: CPT

## 2023-12-13 NOTE — REVIEW OF SYSTEMS
[Negative] : Respiratory [Chest Pain] : no chest pain [Palpitations] : no palpitations [Headache] : no headache [Dizziness] : no dizziness [de-identified] : +chronic constant tremor of hands

## 2023-12-13 NOTE — PLAN
[FreeTextEntry1] : Check labs and continue meds Return 3 months High-dose flu vaccine Prevnar 20 vaccine

## 2023-12-13 NOTE — HISTORY OF PRESENT ILLNESS
[FreeTextEntry1] : here for follow-up of hypertension, hypertriglyceridemia  [de-identified] : Hypercholesterolemia: Last LDL 74  Aug Major cardiovascular risk factors include AGE, MALE, HTN, CHOL. The LDL goal for this patient is <100 mg/dl.  Dietary modifications have included a low-fat diet.  Exercise modifications have included WALKING several miles a week. Responses to the medications have been GOOD. The patient's adherence to the medication treatment is GOOD. The side effects of the medication include NONE; the patient specifically denies myalgias. His weight has CHANGED BY +1 HTN- Compared to last visit the hypertension is AT GOAL. Lifestyle modifications that the patient has adopted include dietary sodium reduction, increasing physical activity, attempts at weight reduction. Responses to the medications has been GOOD. Adverse effects of the medication include NONE. Patient denies chest pain, palpitations, headaches, but + occasional orthostatic dizziness.       Signs of abuse or neglect? No. Fall Risk/History of Falling No.

## 2023-12-13 NOTE — ASSESSMENT
[FreeTextEntry1] : Essential (primary) hypertension - I10 (Primary), Cont lisinopril-hydrochlorothiazide and low salt diet. Urged to increase physical activity and cont efforts at wt loss. Bp control is GOOD.  Pure hyperglyceridemia - E78.1, Tg's high occasionally but no sx's sec to it and LDL remains ok. Follow. Continue atorvastatin and low cholesterol diet. Urged stronger efforts at weight loss. LDL goal is <100 at a minimum. Last LDL at goal 74  Aug  essential tremors- continue propranolol ER 80 mg daily and neuro follow up. Propranolol helps but not as much as he would like it to.  Sleep apnea- reviewed pulmonary note and he appears to be doing significantly better with CPAP.  Risks/benefits of Flu vaccine d/w patient and patient understands and accepts.  Risks/benefits of Prevnar 20 vaccine d/w patient and patient understands and accepts.

## 2023-12-13 NOTE — PHYSICAL EXAM
[No JVD] : no jugular venous distention [Normal] : normal rate, regular rhythm, normal S1 and S2 and no murmur heard [No Carotid Bruits] : no carotid bruits [No Edema] : there was no peripheral edema [de-identified] : NO XANTHELASMAS [de-identified] : +sinus arrhythmia; no S4 [de-identified] : +bilaterally fine hand tremor

## 2023-12-14 LAB
ALBUMIN SERPL ELPH-MCNC: 4.7 G/DL
ALP BLD-CCNC: 56 U/L
ALT SERPL-CCNC: 26 U/L
ANION GAP SERPL CALC-SCNC: 13 MMOL/L
AST SERPL-CCNC: 19 U/L
BILIRUB SERPL-MCNC: 0.7 MG/DL
BUN SERPL-MCNC: 16 MG/DL
CALCIUM SERPL-MCNC: 9.6 MG/DL
CHLORIDE SERPL-SCNC: 101 MMOL/L
CHOLEST SERPL-MCNC: 151 MG/DL
CO2 SERPL-SCNC: 26 MMOL/L
CREAT SERPL-MCNC: 0.96 MG/DL
EGFR: 82 ML/MIN/1.73M2
GLUCOSE SERPL-MCNC: 96 MG/DL
HDLC SERPL-MCNC: 45 MG/DL
LDLC SERPL CALC-MCNC: 64 MG/DL
NONHDLC SERPL-MCNC: 106 MG/DL
POTASSIUM SERPL-SCNC: 4.1 MMOL/L
PROT SERPL-MCNC: 7 G/DL
SODIUM SERPL-SCNC: 140 MMOL/L
TRIGL SERPL-MCNC: 264 MG/DL

## 2023-12-22 ENCOUNTER — NON-APPOINTMENT (OUTPATIENT)
Age: 73
End: 2023-12-22

## 2024-01-05 ENCOUNTER — APPOINTMENT (OUTPATIENT)
Dept: GASTROENTEROLOGY | Facility: HOSPITAL | Age: 74
End: 2024-01-05

## 2024-03-19 RX ORDER — ATORVASTATIN CALCIUM 20 MG/1
20 TABLET, FILM COATED ORAL
Qty: 90 | Refills: 3 | Status: ACTIVE | COMMUNITY
Start: 2020-12-03 | End: 1900-01-01

## 2024-03-26 ENCOUNTER — APPOINTMENT (OUTPATIENT)
Dept: INTERNAL MEDICINE | Facility: CLINIC | Age: 74
End: 2024-03-26
Payer: MEDICARE

## 2024-03-26 VITALS
SYSTOLIC BLOOD PRESSURE: 126 MMHG | HEIGHT: 65 IN | HEART RATE: 64 BPM | WEIGHT: 168 LBS | DIASTOLIC BLOOD PRESSURE: 80 MMHG | BODY MASS INDEX: 27.99 KG/M2

## 2024-03-26 DIAGNOSIS — G25.0 ESSENTIAL TREMOR: ICD-10-CM

## 2024-03-26 PROCEDURE — G2211 COMPLEX E/M VISIT ADD ON: CPT

## 2024-03-26 PROCEDURE — 99214 OFFICE O/P EST MOD 30 MIN: CPT

## 2024-03-26 PROCEDURE — 36415 COLL VENOUS BLD VENIPUNCTURE: CPT

## 2024-03-26 NOTE — HISTORY OF PRESENT ILLNESS
[FreeTextEntry1] : here for follow-up of hypertension, hypertriglyceridemia  [de-identified] : Hypercholesterolemia: Last LDL 64  Dec Major cardiovascular risk factors include AGE, MALE, HTN, CHOL. The LDL goal for this patient is <70 mg/dl.  Dietary modifications have included a low-fat diet.  Exercise modifications have included WALKING several miles a week. Responses to the medications have been GOOD. The patient's adherence to the medication treatment is GOOD. The side effects of the medication include NONE; the patient specifically denies myalgias. His weight has CHANGED BY 0 HTN- Compared to last visit the hypertension is AT GOAL. Lifestyle modifications that the patient has adopted include dietary sodium reduction, increasing physical activity, attempts at weight reduction. Responses to the medications has been GOOD. Adverse effects of the medication include NONE. Patient denies chest pain, palpitations, headaches, but + occasional orthostatic dizziness.       Signs of abuse or neglect? No. Fall Risk/History of Falling No.

## 2024-03-26 NOTE — HEALTH RISK ASSESSMENT
[4 or more  times a week (4 pts)] : 4 or more  times a week (4 points) [Yes] : Yes [3 or 4 (1 pt)] : 3 or 4  (1 point) [Never (0 pts)] : Never (0 points) [No] : In the past 12 months have you used drugs other than those required for medical reasons? No [Former] : Former [5-9] : 5-9 [> 15 Years] : > 15 Years [0] : 2) Feeling down, depressed, or hopeless: Not at all (0) [PHQ-2 Negative - No further assessment needed] : PHQ-2 Negative - No further assessment needed [[id number]] : 0 [Audit-CScore] : 5

## 2024-03-26 NOTE — PHYSICAL EXAM
[No JVD] : no jugular venous distention [Normal] : normal rate, regular rhythm, normal S1 and S2 and no murmur heard [No Carotid Bruits] : no carotid bruits [No Edema] : there was no peripheral edema [de-identified] : NO XANTHELASMAS [de-identified] : +sinus arrhythmia; no S4 [de-identified] : +bilaterally fine hand tremor

## 2024-03-26 NOTE — REVIEW OF SYSTEMS
[Negative] : Respiratory [Chest Pain] : no chest pain [Palpitations] : no palpitations [Headache] : no headache [Dizziness] : no dizziness [de-identified] : +chronic constant tremor of hands

## 2024-03-26 NOTE — ASSESSMENT
[FreeTextEntry1] : Essential (primary) hypertension - I10 (Primary), Cont lisinopril-hydrochlorothiazide and low salt diet. Urged to increase physical activity and cont efforts at wt loss. Bp control is GOOD.  Pure hyperglyceridemia - E78.1, Tg's high occasionally but no sx's sec to it and LDL remains ok. Follow. Continue atorvastatin and low cholesterol diet. Urged stronger efforts at weight loss. LDL goal is <70. Last LDL at goal 64  Dec  essential tremors- continue propranolol ER 80 mg daily and neuro follow up. Propranolol helps but not as much as he would like it to.  Sleep apnea- reviewed pulmonary note and he appears to be doing significantly better with CPAP.

## 2024-03-27 LAB
ALBUMIN SERPL ELPH-MCNC: 4.6 G/DL
ALP BLD-CCNC: 71 U/L
ALT SERPL-CCNC: 32 U/L
ANION GAP SERPL CALC-SCNC: 14 MMOL/L
AST SERPL-CCNC: 26 U/L
BILIRUB SERPL-MCNC: 0.5 MG/DL
BUN SERPL-MCNC: 18 MG/DL
CALCIUM SERPL-MCNC: 10 MG/DL
CHLORIDE SERPL-SCNC: 100 MMOL/L
CHOLEST SERPL-MCNC: 163 MG/DL
CO2 SERPL-SCNC: 25 MMOL/L
CREAT SERPL-MCNC: 1.11 MG/DL
EGFR: 69 ML/MIN/1.73M2
GLUCOSE SERPL-MCNC: 90 MG/DL
HDLC SERPL-MCNC: 45 MG/DL
LDLC SERPL CALC-MCNC: 64 MG/DL
NONHDLC SERPL-MCNC: 118 MG/DL
POTASSIUM SERPL-SCNC: 4.5 MMOL/L
PROT SERPL-MCNC: 7 G/DL
SODIUM SERPL-SCNC: 139 MMOL/L
TRIGL SERPL-MCNC: 346 MG/DL

## 2024-06-17 ENCOUNTER — RX RENEWAL (OUTPATIENT)
Age: 74
End: 2024-06-17

## 2024-06-20 ENCOUNTER — APPOINTMENT (OUTPATIENT)
Dept: INTERNAL MEDICINE | Facility: CLINIC | Age: 74
End: 2024-06-20
Payer: MEDICARE

## 2024-06-20 VITALS
BODY MASS INDEX: 27.49 KG/M2 | HEART RATE: 66 BPM | SYSTOLIC BLOOD PRESSURE: 130 MMHG | DIASTOLIC BLOOD PRESSURE: 80 MMHG | WEIGHT: 165 LBS | HEIGHT: 65 IN

## 2024-06-20 DIAGNOSIS — J31.0 CHRONIC RHINITIS: ICD-10-CM

## 2024-06-20 DIAGNOSIS — E78.1 PURE HYPERGLYCERIDEMIA: ICD-10-CM

## 2024-06-20 DIAGNOSIS — I10 ESSENTIAL (PRIMARY) HYPERTENSION: ICD-10-CM

## 2024-06-20 PROCEDURE — G2211 COMPLEX E/M VISIT ADD ON: CPT

## 2024-06-20 PROCEDURE — 99214 OFFICE O/P EST MOD 30 MIN: CPT

## 2024-06-20 RX ORDER — PANTOPRAZOLE 40 MG/1
40 TABLET, DELAYED RELEASE ORAL
Qty: 30 | Refills: 0 | Status: ACTIVE | COMMUNITY
Start: 2024-06-20

## 2024-06-20 RX ORDER — LISINOPRIL AND HYDROCHLOROTHIAZIDE TABLETS 20; 12.5 MG/1; MG/1
20-12.5 TABLET ORAL
Qty: 180 | Refills: 0 | Status: ACTIVE | COMMUNITY
Start: 2020-10-14 | End: 1900-01-01

## 2024-06-20 RX ORDER — FAMOTIDINE 40 MG/1
40 TABLET, FILM COATED ORAL
Qty: 90 | Refills: 0 | Status: ACTIVE | COMMUNITY
Start: 2024-06-20

## 2024-06-20 RX ORDER — IPRATROPIUM BROMIDE 21 UG/1
0.03 SPRAY NASAL
Qty: 1 | Refills: 2 | Status: ACTIVE | COMMUNITY
Start: 2024-06-20

## 2024-06-20 NOTE — PHYSICAL EXAM
[No JVD] : no jugular venous distention [Normal] : normal rate, regular rhythm, normal S1 and S2 and no murmur heard [No Carotid Bruits] : no carotid bruits [No Edema] : there was no peripheral edema [de-identified] : NO XANTHELASMAS [de-identified] : +sinus arrhythmia; no S4 [de-identified] : +bilaterally fine hand tremor

## 2024-06-20 NOTE — HISTORY OF PRESENT ILLNESS
[FreeTextEntry1] : 73-year-old man here for follow-up of hypertension, hypertriglyceridemia Saw ENT and CV at [facility] Dr. Lira  [de-identified] : Hypercholesterolemia- Major cardiovascular risk factors include hyperlipidemia, hypertension, age. The lipid profile goals for the patient are LDL goal is less than 70 mg/dl. Dietary modifications have included reduced fat intake. Exercise modifications have included WALKING. The side effects of the medication include NONE.  Patient denies myalgias. Since the patient's last visit with me, the weight has CHANGED BY -3 Hypertension- Lifestyle modifications that the patient has adopted include dietary sodium reduction, increasing physical activity, attempts at weight reduction. Adverse effects of the medication include NONE. Patient denies chest pain, palpitations, headaches, dizziness.

## 2024-06-20 NOTE — HEALTH RISK ASSESSMENT
[Yes] : Yes [4 or more  times a week (4 pts)] : 4 or more  times a week (4 points) [3 or 4 (1 pt)] : 3 or 4  (1 point) [Never (0 pts)] : Never (0 points) [No] : In the past 12 months have you used drugs other than those required for medical reasons? No [0] : 2) Feeling down, depressed, or hopeless: Not at all (0) [PHQ-2 Negative - No further assessment needed] : PHQ-2 Negative - No further assessment needed [Former] : Former [5-9] : 5-9 [> 15 Years] : > 15 Years [Audit-CScore] : 5 [[id number]] : 0

## 2024-06-20 NOTE — REVIEW OF SYSTEMS
[Negative] : Respiratory [Chest Pain] : no chest pain [Palpitations] : no palpitations [Headache] : no headache [Dizziness] : no dizziness [de-identified] : +chronic constant tremor of hands

## 2024-06-20 NOTE — ASSESSMENT
[FreeTextEntry1] : Essential (primary) hypertension - continue lisinopril-hydrochlorothiazide 20-12.5 mg, 2 tablets daily and low salt diet. Urged to increase physical activity and continue efforts at wt loss. Bp control is GOOD.  Refilled meds today  Pure hyperglyceridemia - E78.1, Tg's high occasionally but no sx's sec to it and LDL remains ok. Follow. Continue atorvastatin and low cholesterol diet. Urged stronger efforts at weight loss. LDL goal is <70. Last LDL at goal 64  Mar  essential tremors- continue propranolol ER 80 mg daily and neuro follow up. Propranolol helps but not as much as he would like it to.  Sleep apnea- reviewed pulmonary note and he appears to be doing significantly better with CPAP.  I reviewed his 6/18/2024 labs from his CV and notable were , A1c 5.8, LDL 63.  I supplied the patient with my business card which contains my fax number and asked him to have the cardiologist fax me his report and test results.

## 2024-06-21 ENCOUNTER — NON-APPOINTMENT (OUTPATIENT)
Age: 74
End: 2024-06-21

## 2024-09-25 ENCOUNTER — APPOINTMENT (OUTPATIENT)
Dept: PULMONOLOGY | Facility: CLINIC | Age: 74
End: 2024-09-25
Payer: MEDICARE

## 2024-09-25 VITALS
HEART RATE: 60 BPM | DIASTOLIC BLOOD PRESSURE: 68 MMHG | OXYGEN SATURATION: 98 % | SYSTOLIC BLOOD PRESSURE: 110 MMHG | BODY MASS INDEX: 27.66 KG/M2 | RESPIRATION RATE: 16 BRPM | WEIGHT: 166 LBS | TEMPERATURE: 97.7 F | HEIGHT: 65 IN

## 2024-09-25 DIAGNOSIS — I10 ESSENTIAL (PRIMARY) HYPERTENSION: ICD-10-CM

## 2024-09-25 DIAGNOSIS — G47.33 OBSTRUCTIVE SLEEP APNEA (ADULT) (PEDIATRIC): ICD-10-CM

## 2024-09-25 PROCEDURE — 99213 OFFICE O/P EST LOW 20 MIN: CPT

## 2024-09-25 NOTE — PHYSICAL EXAM
[General Appearance - Well Developed] : well developed [General Appearance - Well Nourished] : well nourished [III] : III [Heart Sounds] : normal S1 and S2 [Murmurs] : no murmurs [] : no respiratory distress [Auscultation Breath Sounds / Voice Sounds] : lungs were clear to auscultation bilaterally [Oriented To Time, Place, And Person] : oriented to person, place, and time [Memory Recent] : recent memory was not impaired [FreeTextEntry1] : bells palsy noted

## 2024-09-25 NOTE — ASSESSMENT
[FreeTextEntry1] : 74 year  old man  with sleep apnea is doing well with the CPAP.  Patient is compliant with the CPAP and benefited significantly with the CPAP.

## 2024-09-25 NOTE — HISTORY OF PRESENT ILLNESS
[FreeTextEntry1] : Dr. Heron Alvarez 74 year old man with history of bells palsy, htn, hld is here in the sleep center to address sleep apnea. He has mild sleep apnea 10.8 ahi on 2017 sleep study. Patient has received replacement device from [facility] and is using it regularly.  On CPAP symptoms - Patient is not sleepy with [place] sleepiness score of 5.  Patient has some snoring and also has frequent leaks due to bells palsy.  Patient's bedtime is around 11 PM wakes up in the morning around 7 AM.   He feels rested when he wakes up.  Patient drinks 2 cups of coffee during the daytime. He is not sleepy while driving.  download from cpap on dreamstation 2 pressure 8-20 cm ahi 5.8 usage 6 and half hrs dme aeroflow uses fullface mask but has leaks due to bells palsy.

## 2024-10-15 ENCOUNTER — NON-APPOINTMENT (OUTPATIENT)
Age: 74
End: 2024-10-15

## 2024-10-15 ENCOUNTER — APPOINTMENT (OUTPATIENT)
Dept: INTERNAL MEDICINE | Facility: CLINIC | Age: 74
End: 2024-10-15
Payer: MEDICARE

## 2024-10-15 VITALS
BODY MASS INDEX: 27.99 KG/M2 | RESPIRATION RATE: 16 BRPM | DIASTOLIC BLOOD PRESSURE: 80 MMHG | HEART RATE: 58 BPM | SYSTOLIC BLOOD PRESSURE: 126 MMHG | WEIGHT: 168 LBS | HEIGHT: 65 IN

## 2024-10-15 VITALS
SYSTOLIC BLOOD PRESSURE: 126 MMHG | HEIGHT: 65 IN | WEIGHT: 155 LBS | DIASTOLIC BLOOD PRESSURE: 80 MMHG | HEART RATE: 58 BPM | BODY MASS INDEX: 25.83 KG/M2

## 2024-10-15 DIAGNOSIS — E78.1 PURE HYPERGLYCERIDEMIA: ICD-10-CM

## 2024-10-15 DIAGNOSIS — G47.33 OBSTRUCTIVE SLEEP APNEA (ADULT) (PEDIATRIC): ICD-10-CM

## 2024-10-15 DIAGNOSIS — I10 ESSENTIAL (PRIMARY) HYPERTENSION: ICD-10-CM

## 2024-10-15 DIAGNOSIS — G25.0 ESSENTIAL TREMOR: ICD-10-CM

## 2024-10-15 PROCEDURE — 36415 COLL VENOUS BLD VENIPUNCTURE: CPT

## 2024-10-15 PROCEDURE — 93000 ELECTROCARDIOGRAM COMPLETE: CPT

## 2024-10-15 PROCEDURE — G2211 COMPLEX E/M VISIT ADD ON: CPT

## 2024-10-15 PROCEDURE — 99215 OFFICE O/P EST HI 40 MIN: CPT

## 2024-10-16 ENCOUNTER — NON-APPOINTMENT (OUTPATIENT)
Age: 74
End: 2024-10-16

## 2024-10-16 LAB
25(OH)D3 SERPL-MCNC: 32.1 NG/ML
ALBUMIN SERPL ELPH-MCNC: 4.5 G/DL
ALP BLD-CCNC: 59 U/L
ALT SERPL-CCNC: 29 U/L
ANION GAP SERPL CALC-SCNC: 13 MMOL/L
APPEARANCE: CLEAR
AST SERPL-CCNC: 18 U/L
BASOPHILS # BLD AUTO: 0.06 K/UL
BASOPHILS NFR BLD AUTO: 0.9 %
BILIRUB SERPL-MCNC: 0.6 MG/DL
BILIRUBIN URINE: NEGATIVE
BLOOD URINE: NEGATIVE
BUN SERPL-MCNC: 16 MG/DL
CALCIUM SERPL-MCNC: 9.7 MG/DL
CHLORIDE SERPL-SCNC: 103 MMOL/L
CHOLEST SERPL-MCNC: 148 MG/DL
CO2 SERPL-SCNC: 26 MMOL/L
COLOR: YELLOW
CREAT SERPL-MCNC: 1.08 MG/DL
EGFR: 71 ML/MIN/1.73M2
EOSINOPHIL # BLD AUTO: 0.2 K/UL
EOSINOPHIL NFR BLD AUTO: 3.2 %
GLUCOSE QUALITATIVE U: NEGATIVE MG/DL
GLUCOSE SERPL-MCNC: 97 MG/DL
HCT VFR BLD CALC: 39.8 %
HDLC SERPL-MCNC: 44 MG/DL
HGB BLD-MCNC: 13.3 G/DL
IMM GRANULOCYTES NFR BLD AUTO: 0.2 %
KETONES URINE: NEGATIVE MG/DL
LDLC SERPL CALC-MCNC: 63 MG/DL
LEUKOCYTE ESTERASE URINE: NEGATIVE
LYMPHOCYTES # BLD AUTO: 2.63 K/UL
LYMPHOCYTES NFR BLD AUTO: 41.5 %
MAN DIFF?: NORMAL
MCHC RBC-ENTMCNC: 31.1 PG
MCHC RBC-ENTMCNC: 33.4 GM/DL
MCV RBC AUTO: 93 FL
MONOCYTES # BLD AUTO: 0.73 K/UL
MONOCYTES NFR BLD AUTO: 11.5 %
NEUTROPHILS # BLD AUTO: 2.71 K/UL
NEUTROPHILS NFR BLD AUTO: 42.7 %
NITRITE URINE: NEGATIVE
NONHDLC SERPL-MCNC: 104 MG/DL
PH URINE: 7
PLATELET # BLD AUTO: 215 K/UL
POTASSIUM SERPL-SCNC: 4.3 MMOL/L
PROT SERPL-MCNC: 6.9 G/DL
PROTEIN URINE: NEGATIVE MG/DL
RBC # BLD: 4.28 M/UL
RBC # FLD: 13.2 %
SODIUM SERPL-SCNC: 142 MMOL/L
SPECIFIC GRAVITY URINE: 1.01
TRIGL SERPL-MCNC: 257 MG/DL
TSH SERPL-ACNC: 0.62 UIU/ML
UROBILINOGEN URINE: 0.2 MG/DL
WBC # FLD AUTO: 6.34 K/UL

## 2024-12-25 PROBLEM — F10.90 ALCOHOL USE: Status: ACTIVE | Noted: 2020-09-03

## 2025-02-04 ENCOUNTER — APPOINTMENT (OUTPATIENT)
Dept: INTERNAL MEDICINE | Facility: CLINIC | Age: 75
End: 2025-02-04
Payer: MEDICARE

## 2025-02-04 VITALS
SYSTOLIC BLOOD PRESSURE: 130 MMHG | BODY MASS INDEX: 27.99 KG/M2 | DIASTOLIC BLOOD PRESSURE: 80 MMHG | WEIGHT: 168 LBS | HEIGHT: 65 IN | HEART RATE: 60 BPM

## 2025-02-04 DIAGNOSIS — I10 ESSENTIAL (PRIMARY) HYPERTENSION: ICD-10-CM

## 2025-02-04 DIAGNOSIS — Z23 ENCOUNTER FOR IMMUNIZATION: ICD-10-CM

## 2025-02-04 DIAGNOSIS — E78.1 PURE HYPERGLYCERIDEMIA: ICD-10-CM

## 2025-02-04 PROCEDURE — 99214 OFFICE O/P EST MOD 30 MIN: CPT

## 2025-02-04 PROCEDURE — G2211 COMPLEX E/M VISIT ADD ON: CPT

## 2025-02-04 PROCEDURE — 36415 COLL VENOUS BLD VENIPUNCTURE: CPT

## 2025-02-04 RX ORDER — LISINOPRIL 40 MG/1
40 TABLET ORAL
Qty: 90 | Refills: 0 | Status: ACTIVE | COMMUNITY
Start: 2025-02-04

## 2025-02-04 RX ORDER — MIRTAZAPINE 7.5 MG/1
7.5 TABLET, FILM COATED ORAL
Qty: 30 | Refills: 1 | Status: ACTIVE | COMMUNITY
Start: 2025-02-04

## 2025-02-04 RX ORDER — HYDROCHLOROTHIAZIDE 25 MG/1
25 TABLET ORAL
Qty: 90 | Refills: 0 | Status: ACTIVE | COMMUNITY
Start: 2025-02-04

## 2025-02-05 LAB
ALBUMIN SERPL ELPH-MCNC: 4.5 G/DL
ALP BLD-CCNC: 63 U/L
ALT SERPL-CCNC: 30 U/L
ANION GAP SERPL CALC-SCNC: 15 MMOL/L
AST SERPL-CCNC: 24 U/L
BILIRUB SERPL-MCNC: 0.7 MG/DL
BUN SERPL-MCNC: 19 MG/DL
CALCIUM SERPL-MCNC: 9.6 MG/DL
CHLORIDE SERPL-SCNC: 102 MMOL/L
CHOLEST SERPL-MCNC: 184 MG/DL
CO2 SERPL-SCNC: 24 MMOL/L
CREAT SERPL-MCNC: 0.98 MG/DL
EGFR: 80 ML/MIN/1.73M2
GLUCOSE SERPL-MCNC: 106 MG/DL
HDLC SERPL-MCNC: 44 MG/DL
LDLC SERPL CALC-MCNC: 92 MG/DL
NONHDLC SERPL-MCNC: 140 MG/DL
POTASSIUM SERPL-SCNC: 4.4 MMOL/L
PROT SERPL-MCNC: 7.3 G/DL
SODIUM SERPL-SCNC: 142 MMOL/L
TRIGL SERPL-MCNC: 287 MG/DL

## 2025-05-13 ENCOUNTER — APPOINTMENT (OUTPATIENT)
Dept: INTERNAL MEDICINE | Facility: CLINIC | Age: 75
End: 2025-05-13
Payer: MEDICARE

## 2025-05-13 VITALS
HEIGHT: 65 IN | SYSTOLIC BLOOD PRESSURE: 130 MMHG | WEIGHT: 167 LBS | BODY MASS INDEX: 27.82 KG/M2 | HEART RATE: 62 BPM | DIASTOLIC BLOOD PRESSURE: 60 MMHG

## 2025-05-13 DIAGNOSIS — G25.0 ESSENTIAL TREMOR: ICD-10-CM

## 2025-05-13 DIAGNOSIS — M1A.00X0 IDIOPATHIC CHRONIC GOUT, UNSPECIFIED SITE, W/OUT TOPHUS (TOPHI): ICD-10-CM

## 2025-05-13 DIAGNOSIS — E78.1 PURE HYPERGLYCERIDEMIA: ICD-10-CM

## 2025-05-13 DIAGNOSIS — I10 ESSENTIAL (PRIMARY) HYPERTENSION: ICD-10-CM

## 2025-05-13 PROCEDURE — 36415 COLL VENOUS BLD VENIPUNCTURE: CPT

## 2025-05-13 PROCEDURE — 99214 OFFICE O/P EST MOD 30 MIN: CPT

## 2025-05-13 PROCEDURE — G2211 COMPLEX E/M VISIT ADD ON: CPT

## 2025-05-13 RX ORDER — FENOFIBRATE 145 MG/1
145 TABLET, COATED ORAL
Qty: 90 | Refills: 0 | Status: ACTIVE | COMMUNITY
Start: 2025-05-13

## 2025-05-13 RX ORDER — COLCHICINE 0.6 MG/1
0.6 TABLET ORAL TWICE DAILY
Qty: 20 | Refills: 0 | Status: ACTIVE | COMMUNITY
Start: 2025-05-13 | End: 1900-01-01

## 2025-05-14 LAB
ALBUMIN SERPL ELPH-MCNC: 4.2 G/DL
ALP BLD-CCNC: 65 U/L
ALT SERPL-CCNC: 30 U/L
ANION GAP SERPL CALC-SCNC: 16 MMOL/L
AST SERPL-CCNC: 20 U/L
BILIRUB SERPL-MCNC: 0.6 MG/DL
BUN SERPL-MCNC: 15 MG/DL
CALCIUM SERPL-MCNC: 9.4 MG/DL
CHLORIDE SERPL-SCNC: 104 MMOL/L
CHOLEST SERPL-MCNC: 153 MG/DL
CO2 SERPL-SCNC: 23 MMOL/L
CREAT SERPL-MCNC: 1.16 MG/DL
EGFRCR SERPLBLD CKD-EPI 2021: 65 ML/MIN/1.73M2
GLUCOSE SERPL-MCNC: 94 MG/DL
HDLC SERPL-MCNC: 39 MG/DL
LDLC SERPL-MCNC: 62 MG/DL
NONHDLC SERPL-MCNC: 113 MG/DL
POTASSIUM SERPL-SCNC: 4.2 MMOL/L
PROT SERPL-MCNC: 6.7 G/DL
SODIUM SERPL-SCNC: 143 MMOL/L
TRIGL SERPL-MCNC: 327 MG/DL
URATE SERPL-MCNC: 7.4 MG/DL

## 2025-07-28 ENCOUNTER — RX RENEWAL (OUTPATIENT)
Age: 75
End: 2025-07-28

## 2025-08-14 ENCOUNTER — APPOINTMENT (OUTPATIENT)
Dept: INTERNAL MEDICINE | Facility: CLINIC | Age: 75
End: 2025-08-14
Payer: MEDICARE

## 2025-08-14 VITALS
HEIGHT: 65 IN | SYSTOLIC BLOOD PRESSURE: 120 MMHG | WEIGHT: 166 LBS | HEART RATE: 66 BPM | DIASTOLIC BLOOD PRESSURE: 62 MMHG | BODY MASS INDEX: 27.66 KG/M2

## 2025-08-14 DIAGNOSIS — I10 ESSENTIAL (PRIMARY) HYPERTENSION: ICD-10-CM

## 2025-08-14 DIAGNOSIS — M1A.00X0 IDIOPATHIC CHRONIC GOUT, UNSPECIFIED SITE, W/OUT TOPHUS (TOPHI): ICD-10-CM

## 2025-08-14 DIAGNOSIS — E78.1 PURE HYPERGLYCERIDEMIA: ICD-10-CM

## 2025-08-14 DIAGNOSIS — G25.0 ESSENTIAL TREMOR: ICD-10-CM

## 2025-08-14 PROCEDURE — 36415 COLL VENOUS BLD VENIPUNCTURE: CPT

## 2025-08-14 PROCEDURE — G2211 COMPLEX E/M VISIT ADD ON: CPT

## 2025-08-14 PROCEDURE — 99214 OFFICE O/P EST MOD 30 MIN: CPT

## 2025-08-14 RX ORDER — PROPRANOLOL HYDROCHLORIDE 40 MG/1
40 TABLET ORAL TWICE DAILY
Refills: 0 | Status: ACTIVE | COMMUNITY
Start: 2025-08-14

## 2025-08-16 LAB
ALBUMIN SERPL ELPH-MCNC: 4.5 G/DL
ALP BLD-CCNC: 47 U/L
ALT SERPL-CCNC: 37 U/L
ANION GAP SERPL CALC-SCNC: 13 MMOL/L
AST SERPL-CCNC: 24 U/L
BILIRUB DIRECT SERPL-MCNC: 0.19 MG/DL
BILIRUB INDIRECT SERPL-MCNC: 0.4 MG/DL
BILIRUB SERPL-MCNC: 0.6 MG/DL
BUN SERPL-MCNC: 20 MG/DL
CALCIUM SERPL-MCNC: 9.7 MG/DL
CHLORIDE SERPL-SCNC: 106 MMOL/L
CHOLEST SERPL-MCNC: 130 MG/DL
CO2 SERPL-SCNC: 22 MMOL/L
CREAT SERPL-MCNC: 1.18 MG/DL
EGFRCR SERPLBLD CKD-EPI 2021: 64 ML/MIN/1.73M2
GLUCOSE SERPL-MCNC: 102 MG/DL
HDLC SERPL-MCNC: 44 MG/DL
LDLC SERPL-MCNC: 59 MG/DL
NONHDLC SERPL-MCNC: 86 MG/DL
POTASSIUM SERPL-SCNC: 4.3 MMOL/L
PROT SERPL-MCNC: 6.7 G/DL
SODIUM SERPL-SCNC: 141 MMOL/L
TRIGL SERPL-MCNC: 157 MG/DL
URATE SERPL-MCNC: 5.7 MG/DL